# Patient Record
Sex: FEMALE | Race: WHITE | NOT HISPANIC OR LATINO | Employment: OTHER | URBAN - METROPOLITAN AREA
[De-identification: names, ages, dates, MRNs, and addresses within clinical notes are randomized per-mention and may not be internally consistent; named-entity substitution may affect disease eponyms.]

---

## 2023-05-01 ENCOUNTER — MEDICAL CORRESPONDENCE (OUTPATIENT)
Dept: HEALTH INFORMATION MANAGEMENT | Facility: CLINIC | Age: 73
End: 2023-05-01

## 2023-07-31 ENCOUNTER — ANESTHESIA EVENT (OUTPATIENT)
Dept: SURGERY | Facility: CLINIC | Age: 73
End: 2023-07-31
Payer: COMMERCIAL

## 2023-07-31 RX ORDER — IRBESARTAN 75 MG/1
75 TABLET ORAL DAILY
COMMUNITY

## 2023-07-31 RX ORDER — TRIAMCINOLONE ACETONIDE 1 MG/G
CREAM TOPICAL 2 TIMES DAILY
Status: ON HOLD | COMMUNITY
End: 2023-08-01

## 2023-07-31 RX ORDER — CHLORAL HYDRATE 500 MG
1 CAPSULE ORAL DAILY
COMMUNITY

## 2023-07-31 RX ORDER — CHLORHEXIDINE GLUCONATE ORAL RINSE 1.2 MG/ML
15 SOLUTION DENTAL
COMMUNITY

## 2023-07-31 RX ORDER — SUMATRIPTAN 50 MG/1
50 TABLET, FILM COATED ORAL
COMMUNITY

## 2023-07-31 RX ORDER — ESTRADIOL 0.1 MG/G
CREAM VAGINAL DAILY
COMMUNITY

## 2023-07-31 RX ORDER — HYDROCORTISONE 25 MG/G
CREAM TOPICAL 2 TIMES DAILY PRN
Status: ON HOLD | COMMUNITY
End: 2023-08-01

## 2023-07-31 RX ORDER — BIOTIN 10000 MCG
10 CAPSULE ORAL DAILY
Status: ON HOLD | COMMUNITY
End: 2023-08-01

## 2023-07-31 RX ORDER — FLUOXETINE 20 MG/1
20 TABLET, FILM COATED ORAL DAILY
Status: ON HOLD | COMMUNITY
End: 2023-08-01

## 2023-07-31 RX ORDER — OXYBUTYNIN CHLORIDE 15 MG/1
15 TABLET, EXTENDED RELEASE ORAL DAILY
COMMUNITY

## 2023-07-31 RX ORDER — CETIRIZINE HYDROCHLORIDE 10 MG/1
10 TABLET ORAL DAILY
COMMUNITY

## 2023-07-31 RX ORDER — LACTOBACILLUS RHAMNOSUS GG 10B CELL
1 CAPSULE ORAL 2 TIMES DAILY
Status: ON HOLD | COMMUNITY
End: 2023-08-01

## 2023-08-01 ENCOUNTER — ANESTHESIA (OUTPATIENT)
Dept: SURGERY | Facility: CLINIC | Age: 73
End: 2023-08-01
Payer: COMMERCIAL

## 2023-08-01 ENCOUNTER — HOSPITAL ENCOUNTER (OUTPATIENT)
Facility: CLINIC | Age: 73
Discharge: NURSING, REHAB OR LONG-TERM CARE | End: 2023-08-04
Attending: ORTHOPAEDIC SURGERY | Admitting: ORTHOPAEDIC SURGERY
Payer: COMMERCIAL

## 2023-08-01 DIAGNOSIS — M75.121 COMPLETE ROTATOR CUFF TEAR OR RUPTURE OF RIGHT SHOULDER, NOT SPECIFIED AS TRAUMATIC: ICD-10-CM

## 2023-08-01 DIAGNOSIS — Z98.890 POST-OPERATIVE STATE: Primary | ICD-10-CM

## 2023-08-01 DIAGNOSIS — M75.41 IMPINGEMENT SYNDROME OF RIGHT SHOULDER: ICD-10-CM

## 2023-08-01 PROCEDURE — 250N000009 HC RX 250: Performed by: ORTHOPAEDIC SURGERY

## 2023-08-01 PROCEDURE — 250N000009 HC RX 250: Performed by: ANESTHESIOLOGY

## 2023-08-01 PROCEDURE — 250N000009 HC RX 250: Performed by: NURSE ANESTHETIST, CERTIFIED REGISTERED

## 2023-08-01 PROCEDURE — 272N000001 HC OR GENERAL SUPPLY STERILE: Performed by: ORTHOPAEDIC SURGERY

## 2023-08-01 PROCEDURE — 250N000011 HC RX IP 250 OP 636: Mod: JZ | Performed by: ANESTHESIOLOGY

## 2023-08-01 PROCEDURE — 999N000141 HC STATISTIC PRE-PROCEDURE NURSING ASSESSMENT: Performed by: ORTHOPAEDIC SURGERY

## 2023-08-01 PROCEDURE — 250N000011 HC RX IP 250 OP 636: Mod: JZ | Performed by: ORTHOPAEDIC SURGERY

## 2023-08-01 PROCEDURE — 258N000003 HC RX IP 258 OP 636: Performed by: PHYSICIAN ASSISTANT

## 2023-08-01 PROCEDURE — 250N000011 HC RX IP 250 OP 636: Performed by: PHYSICIAN ASSISTANT

## 2023-08-01 PROCEDURE — 250N000025 HC SEVOFLURANE, PER MIN: Performed by: ORTHOPAEDIC SURGERY

## 2023-08-01 PROCEDURE — 250N000013 HC RX MED GY IP 250 OP 250 PS 637: Performed by: PHYSICIAN ASSISTANT

## 2023-08-01 PROCEDURE — 258N000003 HC RX IP 258 OP 636: Performed by: NURSE ANESTHETIST, CERTIFIED REGISTERED

## 2023-08-01 PROCEDURE — 93010 ELECTROCARDIOGRAM REPORT: CPT | Performed by: INTERNAL MEDICINE

## 2023-08-01 PROCEDURE — C1713 ANCHOR/SCREW BN/BN,TIS/BN: HCPCS | Performed by: ORTHOPAEDIC SURGERY

## 2023-08-01 PROCEDURE — 93005 ELECTROCARDIOGRAM TRACING: CPT

## 2023-08-01 PROCEDURE — 360N000077 HC SURGERY LEVEL 4, PER MIN: Performed by: ORTHOPAEDIC SURGERY

## 2023-08-01 PROCEDURE — 250N000012 HC RX MED GY IP 250 OP 636 PS 637: Performed by: ANESTHESIOLOGY

## 2023-08-01 PROCEDURE — 250N000011 HC RX IP 250 OP 636: Performed by: NURSE ANESTHETIST, CERTIFIED REGISTERED

## 2023-08-01 PROCEDURE — 250N000013 HC RX MED GY IP 250 OP 250 PS 637: Performed by: NURSE ANESTHETIST, CERTIFIED REGISTERED

## 2023-08-01 PROCEDURE — 370N000017 HC ANESTHESIA TECHNICAL FEE, PER MIN: Performed by: ORTHOPAEDIC SURGERY

## 2023-08-01 PROCEDURE — 710N000009 HC RECOVERY PHASE 1, LEVEL 1, PER MIN: Performed by: ORTHOPAEDIC SURGERY

## 2023-08-01 DEVICE — IMP ANCHOR ARTHREX BIO-SWIVLK W/F TAPE 4.75MM AR-2600SBS-4: Type: IMPLANTABLE DEVICE | Site: SHOULDER | Status: FUNCTIONAL

## 2023-08-01 RX ORDER — FENTANYL CITRATE 50 UG/ML
INJECTION, SOLUTION INTRAMUSCULAR; INTRAVENOUS PRN
Status: DISCONTINUED | OUTPATIENT
Start: 2023-08-01 | End: 2023-08-01

## 2023-08-01 RX ORDER — FENTANYL CITRATE 0.05 MG/ML
50 INJECTION, SOLUTION INTRAMUSCULAR; INTRAVENOUS EVERY 5 MIN PRN
Status: DISCONTINUED | OUTPATIENT
Start: 2023-08-01 | End: 2023-08-01 | Stop reason: HOSPADM

## 2023-08-01 RX ORDER — APREPITANT 40 MG/1
40 CAPSULE ORAL ONCE
Status: COMPLETED | OUTPATIENT
Start: 2023-08-01 | End: 2023-08-01

## 2023-08-01 RX ORDER — NALOXONE HYDROCHLORIDE 0.4 MG/ML
0.2 INJECTION, SOLUTION INTRAMUSCULAR; INTRAVENOUS; SUBCUTANEOUS
Status: DISCONTINUED | OUTPATIENT
Start: 2023-08-01 | End: 2023-08-04 | Stop reason: HOSPADM

## 2023-08-01 RX ORDER — HYDROMORPHONE HCL IN WATER/PF 6 MG/30 ML
0.2 PATIENT CONTROLLED ANALGESIA SYRINGE INTRAVENOUS EVERY 5 MIN PRN
Status: DISCONTINUED | OUTPATIENT
Start: 2023-08-01 | End: 2023-08-01 | Stop reason: HOSPADM

## 2023-08-01 RX ORDER — NITROFURANTOIN MACROCRYSTALS 50 MG/1
50 CAPSULE ORAL DAILY PRN
COMMUNITY

## 2023-08-01 RX ORDER — ONDANSETRON 4 MG/1
4 TABLET, ORALLY DISINTEGRATING ORAL EVERY 6 HOURS PRN
Status: DISCONTINUED | OUTPATIENT
Start: 2023-08-01 | End: 2023-08-04 | Stop reason: HOSPADM

## 2023-08-01 RX ORDER — AMOXICILLIN 250 MG
1 CAPSULE ORAL 2 TIMES DAILY
Status: DISCONTINUED | OUTPATIENT
Start: 2023-08-01 | End: 2023-08-04 | Stop reason: HOSPADM

## 2023-08-01 RX ORDER — SODIUM CHLORIDE, SODIUM LACTATE, POTASSIUM CHLORIDE, CALCIUM CHLORIDE 600; 310; 30; 20 MG/100ML; MG/100ML; MG/100ML; MG/100ML
INJECTION, SOLUTION INTRAVENOUS CONTINUOUS PRN
Status: DISCONTINUED | OUTPATIENT
Start: 2023-08-01 | End: 2023-08-01

## 2023-08-01 RX ORDER — LIDOCAINE HYDROCHLORIDE 20 MG/ML
INJECTION, SOLUTION INFILTRATION; PERINEURAL PRN
Status: DISCONTINUED | OUTPATIENT
Start: 2023-08-01 | End: 2023-08-01

## 2023-08-01 RX ORDER — FENTANYL CITRATE 0.05 MG/ML
25 INJECTION, SOLUTION INTRAMUSCULAR; INTRAVENOUS EVERY 5 MIN PRN
Status: DISCONTINUED | OUTPATIENT
Start: 2023-08-01 | End: 2023-08-01 | Stop reason: HOSPADM

## 2023-08-01 RX ORDER — HALOPERIDOL 5 MG/ML
1 INJECTION INTRAMUSCULAR
Status: COMPLETED | OUTPATIENT
Start: 2023-08-01 | End: 2023-08-01

## 2023-08-01 RX ORDER — CELECOXIB 200 MG/1
400 CAPSULE ORAL ONCE
Status: COMPLETED | OUTPATIENT
Start: 2023-08-01 | End: 2023-08-01

## 2023-08-01 RX ORDER — PROCHLORPERAZINE MALEATE 5 MG
5 TABLET ORAL EVERY 6 HOURS PRN
Status: DISCONTINUED | OUTPATIENT
Start: 2023-08-01 | End: 2023-08-04 | Stop reason: HOSPADM

## 2023-08-01 RX ORDER — HYDROXYZINE HYDROCHLORIDE 10 MG/1
10 TABLET, FILM COATED ORAL EVERY 6 HOURS PRN
Status: DISCONTINUED | OUTPATIENT
Start: 2023-08-01 | End: 2023-08-04 | Stop reason: HOSPADM

## 2023-08-01 RX ORDER — IPRATROPIUM BROMIDE AND ALBUTEROL SULFATE 2.5; .5 MG/3ML; MG/3ML
3 SOLUTION RESPIRATORY (INHALATION)
Status: DISCONTINUED | OUTPATIENT
Start: 2023-08-01 | End: 2023-08-01 | Stop reason: HOSPADM

## 2023-08-01 RX ORDER — ALBUTEROL SULFATE 90 UG/1
AEROSOL, METERED RESPIRATORY (INHALATION) PRN
Status: DISCONTINUED | OUTPATIENT
Start: 2023-08-01 | End: 2023-08-01

## 2023-08-01 RX ORDER — DEXAMETHASONE SODIUM PHOSPHATE 4 MG/ML
4 INJECTION, SOLUTION INTRA-ARTICULAR; INTRALESIONAL; INTRAMUSCULAR; INTRAVENOUS; SOFT TISSUE ONCE
Status: COMPLETED | OUTPATIENT
Start: 2023-08-01 | End: 2023-08-01

## 2023-08-01 RX ORDER — NALOXONE HYDROCHLORIDE 0.4 MG/ML
0.4 INJECTION, SOLUTION INTRAMUSCULAR; INTRAVENOUS; SUBCUTANEOUS
Status: DISCONTINUED | OUTPATIENT
Start: 2023-08-01 | End: 2023-08-04 | Stop reason: HOSPADM

## 2023-08-01 RX ORDER — ASPIRIN 81 MG/1
162 TABLET ORAL DAILY
Status: DISCONTINUED | OUTPATIENT
Start: 2023-08-01 | End: 2023-08-04 | Stop reason: HOSPADM

## 2023-08-01 RX ORDER — ACETAMINOPHEN 325 MG/1
650 TABLET ORAL
Status: DISCONTINUED | OUTPATIENT
Start: 2023-08-01 | End: 2023-08-01 | Stop reason: ALTCHOICE

## 2023-08-01 RX ORDER — ONDANSETRON 4 MG/1
4 TABLET, ORALLY DISINTEGRATING ORAL EVERY 30 MIN PRN
Status: COMPLETED | OUTPATIENT
Start: 2023-08-01 | End: 2023-08-01

## 2023-08-01 RX ORDER — MAGNESIUM HYDROXIDE 1200 MG/15ML
LIQUID ORAL PRN
Status: DISCONTINUED | OUTPATIENT
Start: 2023-08-01 | End: 2023-08-01 | Stop reason: HOSPADM

## 2023-08-01 RX ORDER — ONDANSETRON 4 MG/1
4 TABLET, ORALLY DISINTEGRATING ORAL EVERY 8 HOURS PRN
Qty: 10 TABLET | Refills: 0 | Status: SHIPPED | OUTPATIENT
Start: 2023-08-01 | End: 2024-09-16

## 2023-08-01 RX ORDER — TRAMADOL HYDROCHLORIDE 50 MG/1
50 TABLET ORAL EVERY 6 HOURS PRN
Status: DISCONTINUED | OUTPATIENT
Start: 2023-08-01 | End: 2023-08-04 | Stop reason: HOSPADM

## 2023-08-01 RX ORDER — LIDOCAINE 40 MG/G
CREAM TOPICAL
Status: DISCONTINUED | OUTPATIENT
Start: 2023-08-01 | End: 2023-08-04 | Stop reason: HOSPADM

## 2023-08-01 RX ORDER — DEXAMETHASONE SODIUM PHOSPHATE 4 MG/ML
INJECTION, SOLUTION INTRA-ARTICULAR; INTRALESIONAL; INTRAMUSCULAR; INTRAVENOUS; SOFT TISSUE PRN
Status: DISCONTINUED | OUTPATIENT
Start: 2023-08-01 | End: 2023-08-01

## 2023-08-01 RX ORDER — SODIUM CHLORIDE, SODIUM LACTATE, POTASSIUM CHLORIDE, CALCIUM CHLORIDE 600; 310; 30; 20 MG/100ML; MG/100ML; MG/100ML; MG/100ML
INJECTION, SOLUTION INTRAVENOUS CONTINUOUS
Status: DISCONTINUED | OUTPATIENT
Start: 2023-08-01 | End: 2023-08-04 | Stop reason: HOSPADM

## 2023-08-01 RX ORDER — TRAMADOL HYDROCHLORIDE 50 MG/1
50 TABLET ORAL EVERY 6 HOURS PRN
Qty: 40 TABLET | Refills: 0 | Status: SHIPPED | OUTPATIENT
Start: 2023-08-01 | End: 2023-08-02

## 2023-08-01 RX ORDER — ACETAMINOPHEN 325 MG/1
975 TABLET ORAL ONCE
Status: COMPLETED | OUTPATIENT
Start: 2023-08-01 | End: 2023-08-01

## 2023-08-01 RX ORDER — BUPIVACAINE HYDROCHLORIDE AND EPINEPHRINE 5; 5 MG/ML; UG/ML
INJECTION, SOLUTION PERINEURAL
Status: COMPLETED | OUTPATIENT
Start: 2023-08-01 | End: 2023-08-01

## 2023-08-01 RX ORDER — DIPHENHYDRAMINE HCL 12.5MG/5ML
12.5 LIQUID (ML) ORAL EVERY 6 HOURS PRN
Status: DISCONTINUED | OUTPATIENT
Start: 2023-08-01 | End: 2023-08-04 | Stop reason: HOSPADM

## 2023-08-01 RX ORDER — SODIUM CHLORIDE, SODIUM LACTATE, POTASSIUM CHLORIDE, CALCIUM CHLORIDE 600; 310; 30; 20 MG/100ML; MG/100ML; MG/100ML; MG/100ML
INJECTION, SOLUTION INTRAVENOUS CONTINUOUS
Status: DISCONTINUED | OUTPATIENT
Start: 2023-08-01 | End: 2023-08-01 | Stop reason: HOSPADM

## 2023-08-01 RX ORDER — ONDANSETRON 2 MG/ML
4 INJECTION INTRAMUSCULAR; INTRAVENOUS EVERY 6 HOURS PRN
Status: DISCONTINUED | OUTPATIENT
Start: 2023-08-01 | End: 2023-08-04 | Stop reason: HOSPADM

## 2023-08-01 RX ORDER — BISACODYL 10 MG
10 SUPPOSITORY, RECTAL RECTAL DAILY PRN
Status: DISCONTINUED | OUTPATIENT
Start: 2023-08-01 | End: 2023-08-04 | Stop reason: HOSPADM

## 2023-08-01 RX ORDER — NAPROXEN 500 MG/1
500 TABLET ORAL 2 TIMES DAILY WITH MEALS
Qty: 40 TABLET | Refills: 0 | Status: SHIPPED | OUTPATIENT
Start: 2023-08-01 | End: 2024-09-16

## 2023-08-01 RX ORDER — HYDROMORPHONE HCL IN WATER/PF 6 MG/30 ML
0.4 PATIENT CONTROLLED ANALGESIA SYRINGE INTRAVENOUS
Status: DISCONTINUED | OUTPATIENT
Start: 2023-08-01 | End: 2023-08-04 | Stop reason: HOSPADM

## 2023-08-01 RX ORDER — NAPROXEN 250 MG/1
250 TABLET ORAL EVERY 12 HOURS PRN
Status: DISCONTINUED | OUTPATIENT
Start: 2023-08-01 | End: 2023-08-04 | Stop reason: HOSPADM

## 2023-08-01 RX ORDER — BUPIVACAINE HYDROCHLORIDE AND EPINEPHRINE 2.5; 5 MG/ML; UG/ML
INJECTION, SOLUTION INFILTRATION; PERINEURAL PRN
Status: DISCONTINUED | OUTPATIENT
Start: 2023-08-01 | End: 2023-08-01 | Stop reason: HOSPADM

## 2023-08-01 RX ORDER — HYDROCODONE BITARTRATE AND ACETAMINOPHEN 5; 325 MG/1; MG/1
1-2 TABLET ORAL
Status: DISCONTINUED | OUTPATIENT
Start: 2023-08-01 | End: 2023-08-01 | Stop reason: ALTCHOICE

## 2023-08-01 RX ORDER — HYDROMORPHONE HCL IN WATER/PF 6 MG/30 ML
0.4 PATIENT CONTROLLED ANALGESIA SYRINGE INTRAVENOUS EVERY 5 MIN PRN
Status: DISCONTINUED | OUTPATIENT
Start: 2023-08-01 | End: 2023-08-01 | Stop reason: HOSPADM

## 2023-08-01 RX ORDER — PROPOFOL 10 MG/ML
INJECTION, EMULSION INTRAVENOUS PRN
Status: DISCONTINUED | OUTPATIENT
Start: 2023-08-01 | End: 2023-08-01

## 2023-08-01 RX ORDER — CEFAZOLIN SODIUM/WATER 2 G/20 ML
2 SYRINGE (ML) INTRAVENOUS
Status: COMPLETED | OUTPATIENT
Start: 2023-08-01 | End: 2023-08-01

## 2023-08-01 RX ORDER — ONDANSETRON 2 MG/ML
4 INJECTION INTRAMUSCULAR; INTRAVENOUS EVERY 30 MIN PRN
Status: COMPLETED | OUTPATIENT
Start: 2023-08-01 | End: 2023-08-01

## 2023-08-01 RX ORDER — ONDANSETRON 2 MG/ML
INJECTION INTRAMUSCULAR; INTRAVENOUS PRN
Status: DISCONTINUED | OUTPATIENT
Start: 2023-08-01 | End: 2023-08-01

## 2023-08-01 RX ORDER — ACETAMINOPHEN 325 MG/1
650 TABLET ORAL EVERY 4 HOURS PRN
Qty: 50 TABLET | Refills: 0 | Status: SHIPPED | OUTPATIENT
Start: 2023-08-01 | End: 2024-09-16

## 2023-08-01 RX ORDER — PROPOFOL 10 MG/ML
INJECTION, EMULSION INTRAVENOUS CONTINUOUS PRN
Status: DISCONTINUED | OUTPATIENT
Start: 2023-08-01 | End: 2023-08-01

## 2023-08-01 RX ORDER — POLYETHYLENE GLYCOL 3350 17 G/17G
17 POWDER, FOR SOLUTION ORAL DAILY
Status: DISCONTINUED | OUTPATIENT
Start: 2023-08-02 | End: 2023-08-04 | Stop reason: HOSPADM

## 2023-08-01 RX ORDER — HYDROMORPHONE HCL IN WATER/PF 6 MG/30 ML
0.2 PATIENT CONTROLLED ANALGESIA SYRINGE INTRAVENOUS
Status: DISCONTINUED | OUTPATIENT
Start: 2023-08-01 | End: 2023-08-04 | Stop reason: HOSPADM

## 2023-08-01 RX ORDER — HALOPERIDOL 5 MG/ML
1 INJECTION INTRAMUSCULAR ONCE
Status: DISCONTINUED | OUTPATIENT
Start: 2023-08-01 | End: 2023-08-01 | Stop reason: HOSPADM

## 2023-08-01 RX ORDER — CEFAZOLIN SODIUM 2 G/100ML
2 INJECTION, SOLUTION INTRAVENOUS EVERY 8 HOURS
Status: COMPLETED | OUTPATIENT
Start: 2023-08-01 | End: 2023-08-02

## 2023-08-01 RX ORDER — CEFAZOLIN SODIUM/WATER 2 G/20 ML
2 SYRINGE (ML) INTRAVENOUS SEE ADMIN INSTRUCTIONS
Status: DISCONTINUED | OUTPATIENT
Start: 2023-08-01 | End: 2023-08-01 | Stop reason: HOSPADM

## 2023-08-01 RX ORDER — ACETAMINOPHEN 325 MG/1
975 TABLET ORAL EVERY 8 HOURS
Status: COMPLETED | OUTPATIENT
Start: 2023-08-01 | End: 2023-08-04

## 2023-08-01 RX ADMIN — SODIUM CHLORIDE, POTASSIUM CHLORIDE, SODIUM LACTATE AND CALCIUM CHLORIDE: 600; 310; 30; 20 INJECTION, SOLUTION INTRAVENOUS at 20:52

## 2023-08-01 RX ADMIN — ONDANSETRON 4 MG: 2 INJECTION INTRAMUSCULAR; INTRAVENOUS at 16:18

## 2023-08-01 RX ADMIN — PROPOFOL 200 MG: 10 INJECTION, EMULSION INTRAVENOUS at 13:01

## 2023-08-01 RX ADMIN — FENTANYL CITRATE 50 MCG: 50 INJECTION, SOLUTION INTRAMUSCULAR; INTRAVENOUS at 13:28

## 2023-08-01 RX ADMIN — CELECOXIB 400 MG: 200 CAPSULE ORAL at 11:33

## 2023-08-01 RX ADMIN — ROCURONIUM BROMIDE 50 MG: 50 INJECTION, SOLUTION INTRAVENOUS at 13:03

## 2023-08-01 RX ADMIN — ALBUTEROL SULFATE 6 PUFF: 90 AEROSOL, METERED RESPIRATORY (INHALATION) at 14:22

## 2023-08-01 RX ADMIN — HALOPERIDOL LACTATE 1 MG: 5 INJECTION, SOLUTION INTRAMUSCULAR at 16:49

## 2023-08-01 RX ADMIN — SODIUM CHLORIDE, POTASSIUM CHLORIDE, SODIUM LACTATE AND CALCIUM CHLORIDE: 600; 310; 30; 20 INJECTION, SOLUTION INTRAVENOUS at 14:02

## 2023-08-01 RX ADMIN — BUPIVACAINE HYDROCHLORIDE AND EPINEPHRINE BITARTRATE 27 ML: 5; .005 INJECTION, SOLUTION PERINEURAL at 12:11

## 2023-08-01 RX ADMIN — IPRATROPIUM BROMIDE AND ALBUTEROL SULFATE 3 ML: .5; 3 SOLUTION RESPIRATORY (INHALATION) at 17:12

## 2023-08-01 RX ADMIN — DEXAMETHASONE SODIUM PHOSPHATE 4 MG: 4 INJECTION, SOLUTION INTRAMUSCULAR; INTRAVENOUS at 17:59

## 2023-08-01 RX ADMIN — SUCCINYLCHOLINE CHLORIDE 40 MG: 20 INJECTION, SOLUTION INTRAMUSCULAR; INTRAVENOUS; PARENTERAL at 13:01

## 2023-08-01 RX ADMIN — DEXAMETHASONE SODIUM PHOSPHATE 4 MG: 4 INJECTION, SOLUTION INTRA-ARTICULAR; INTRALESIONAL; INTRAMUSCULAR; INTRAVENOUS; SOFT TISSUE at 13:58

## 2023-08-01 RX ADMIN — Medication 2 G: at 13:18

## 2023-08-01 RX ADMIN — ACETAMINOPHEN 975 MG: 325 TABLET, FILM COATED ORAL at 11:32

## 2023-08-01 RX ADMIN — ASPIRIN 162 MG: 81 TABLET, COATED ORAL at 20:52

## 2023-08-01 RX ADMIN — APREPITANT 40 MG: 40 CAPSULE ORAL at 17:59

## 2023-08-01 RX ADMIN — FENTANYL CITRATE 50 MCG: 50 INJECTION, SOLUTION INTRAMUSCULAR; INTRAVENOUS at 13:01

## 2023-08-01 RX ADMIN — ONDANSETRON 4 MG: 2 INJECTION INTRAMUSCULAR; INTRAVENOUS at 14:16

## 2023-08-01 RX ADMIN — MIDAZOLAM 1 MG: 1 INJECTION INTRAMUSCULAR; INTRAVENOUS at 12:19

## 2023-08-01 RX ADMIN — ONDANSETRON 4 MG: 4 TABLET, ORALLY DISINTEGRATING ORAL at 20:52

## 2023-08-01 RX ADMIN — LIDOCAINE HYDROCHLORIDE 100 MG: 20 INJECTION, SOLUTION INFILTRATION; PERINEURAL at 13:01

## 2023-08-01 RX ADMIN — ACETAMINOPHEN 975 MG: 325 TABLET, FILM COATED ORAL at 20:52

## 2023-08-01 RX ADMIN — PHENYLEPHRINE HYDROCHLORIDE 0.2 MCG/KG/MIN: 10 INJECTION INTRAVENOUS at 13:28

## 2023-08-01 RX ADMIN — SODIUM CHLORIDE, POTASSIUM CHLORIDE, SODIUM LACTATE AND CALCIUM CHLORIDE: 600; 310; 30; 20 INJECTION, SOLUTION INTRAVENOUS at 12:48

## 2023-08-01 RX ADMIN — PROPOFOL 30 MCG/KG/MIN: 10 INJECTION, EMULSION INTRAVENOUS at 13:10

## 2023-08-01 RX ADMIN — PROCHLORPERAZINE EDISYLATE 5 MG: 5 INJECTION INTRAMUSCULAR; INTRAVENOUS at 22:46

## 2023-08-01 RX ADMIN — SUGAMMADEX 300 MG: 100 INJECTION, SOLUTION INTRAVENOUS at 14:28

## 2023-08-01 RX ADMIN — ONDANSETRON 4 MG: 2 INJECTION INTRAMUSCULAR; INTRAVENOUS at 15:40

## 2023-08-01 RX ADMIN — CEFAZOLIN SODIUM 2 G: 2 INJECTION, SOLUTION INTRAVENOUS at 21:44

## 2023-08-01 ASSESSMENT — ACTIVITIES OF DAILY LIVING (ADL)
ADLS_ACUITY_SCORE: 19
ADLS_ACUITY_SCORE: 33
ADLS_ACUITY_SCORE: 19

## 2023-08-01 ASSESSMENT — LIFESTYLE VARIABLES: TOBACCO_USE: 0

## 2023-08-01 ASSESSMENT — ENCOUNTER SYMPTOMS
DYSRHYTHMIAS: 0
SEIZURES: 0

## 2023-08-01 NOTE — OP NOTE
Procedure Date: 08/01/2023    SURGEON:  Ford Delgado MD.    ORTHOPEDIC PHYSICIAN ASSISTANT:  YUE Alcocer.    PREOPERATIVE DIAGNOSES:     1.  Right shoulder full-thickness supraspinatus tear.  2.  Right shoulder impingement.  3.  Right shoulder acromioclavicular arthritis.  4.  Right shoulder loose body.  5.  Chondromalacia the humeral head and significant synovitis of glenohumeral joint with labral fraying.    POSTOPERATIVE DIAGNOSES:     1.  Right shoulder full-thickness supraspinatus tear.  2.  Right shoulder impingement.  3.  Right shoulder acromioclavicular arthritis.  4.  Right shoulder loose body.  5.  Chondromalacia the humeral head and significant synovitis of glenohumeral joint with labral fraying.     PROCEDURES:     1.  Diagnostic right shoulder arthroscopy.  2.  Right shoulder arthroscopic subacromial decompression.  3.  Right shoulder arthroscopic distal clavicle excision.  4.  Right shoulder arthroscopic extensive debridement of glenoid, labrum, anterior triangle and humeral head.  5.  Right shoulder arthroscopic removal of loose body.  6.  Right shoulder arthroscopic rotator cuff repair using transosseous-equivalent technique.    INDICATIONS FOR SURGERY:  The patient is a 72-year-old female, right hand-dominant, with increasing right shoulder pain.  For more complete account of history of present illness, please see her clinic note.  She had failed reasonable nonoperative options and continued to have significant discomfort.  We discussed treatment and felt she would benefit from the above-named procedure.  Informed consent was obtained.    ANESTHETIC:  Interscalene block with general endotracheal anesthetic.    FINDINGS:     1.  Anterior triangle:  There was a large amount of synovitis.  The subscapularis had moderate fraying at the insertion.  Otherwise, intact.  2.  Glenoid:  No chondromalacia.  No bald spot.  3.  Humerus:  There was diffuse grade 2 chondromalacia over the entire weightbearing  aspect of the humeral head.  There was no exposed bone.  4.  Axillary pouch:  Mild amount of synovitis.  No loose bodies.  5.  Bicipital/labral complex:  The biceps did track well down the bicipital groove without significant tenosynovitis.  The labrum showed significant circumferential fraying.    6.  Rotator cuff:  There was a full-thickness tear of the supraspinatus.  This tissue still was of very good quality and quite mobile.  It measured approximately 2 cm anterior to posterior and 1.5 cm medial to lateral.  It still was very mobile and would completely close down to the footprint.   7.  Subacromial space:  A large amount of reactive bursal tissue was present.  There was a very large lateral ridge, which was consistent with a very large type 3 acromion.  The AC joint had significant synovitis with grade 4 chondromalacia.  There was also a loose body that measured approximately 8 x 9 x 3 mm.  That was in the subacromial space as well.    DESCRIPTION OF PROCEDURE:  The patient was correctly identified by myself in the PAR.  Her right upper extremity was marked.  An interscalene block was administered.  She was then taken to the operating room, where she was placed under general endotracheal anesthetic.  She was then placed in the beach chair position and sedated.  Ancef was administered.  She was then prepped and draped in the usual fashion.  A timeout was then performed.  The portal sites were injected with 0.25% Marcaine with epinephrine followed by induction of the trocars.  A diagnostic arthroscopy was then carried out, with the above-named findings.    The scope was then placed in the subacromial space.  A working lateral portal was created under direct visualization.  A cautery wand was utilized to perform the bursectomy, release the superior capsule off the AC joint and, also, debride the footprint of the tuberosity down to a nice bed of bare bone.  We then took the synovial resector and debrided the  rotator cuff back to good stable edges.  Through the rotator cuff tear, I created an accessory portal and was able to debride the majority of the humeral head from this portal and, also, debride the labrum until this was back to good, stable edges.  I then moved my visualization and was able to clearly see the anterior triangle and reached the anterior triangle.  I debrided the synovitis in this area as well.  The 6 mm bur was brought in, and the footprint of the tuberosity was decorticated down to a nice bed of bleeding cortical cancellous bone.    The scope was then placed laterally.  A 6 mm bur was placed posteriorly.  A cutting block technique was utilized to perform the acromioplasty, converting the type 3 acromion to a type 1 acromion.  A separate stab portal was then made directly anterior and adjacent to the AC joint.  Through this, the distal 8 mm of distal clavicle were excised again with the 6 mm bur.  Adequacy of the excision was confirmed with the anterior portal as well.  At this point, we did encounter the large loose body, and this was removed using a grasper in its entirety.    Attention was next turned back to the rotator cuff.  The scope was placed posteriorly.  Separate stab portals were then made.  We put our medial row anchors, 1 anteriorly and 1 posteriorly, both with outstanding purchase.  The sutures from each were then passed through the rotator cuff using a FiberLink.  A pulley stitch was then tied in between each anchor with the sutures and tapes from each anchor and placed 2 lateral anchors, giving us a very nice watertight closure.  The trocars were removed from the shoulder.  The wounds were closed with 3-0 Monocryl, Steri-Strips as well as sterile dressings and an UltraSling.    COMPLICATIONS:  None.    SPECIMENS:  None.    BLOOD LOSS:  Less than 5 mL.    COUNTS:  Sponge and needle counts were correct.    DISPOSITION:  The patient was taken to the [facility] in stable condition.    Ford MCRAE  MD Danny        D: 2023   T: 2023   MT: [email or web address]    Name:     ERICK ASIF  MRN:      [id number]        Account:        [id number]   :      1950           Procedure Date: 2023     Document: [id number]

## 2023-08-01 NOTE — DISCHARGE INSTRUCTIONS
Today you were given 975 mg of Tylenol at 11:30AM. The recommended daily maximum dose is 4000 mg.        Same Day Surgery Discharge Instructions for  Sedation and General Anesthesia     It's not unusual to feel dizzy, light-headed or faint for up to 24 hours after surgery or while taking pain medication.  If you have these symptoms: sit for a few minutes before standing and have someone assist you when you get up to walk or use the bathroom.    You should rest and relax for the next 24 hours. We recommend you make arrangements to have an adult stay with you for at least 24 hours after your discharge.  Avoid hazardous and strenuous activity.    DO NOT DRIVE any vehicle or operate mechanical equipment for 24 hours following the end of your surgery.  Even though you may feel normal, your reactions may be affected by the medication you have received.    Do not drink alcoholic beverages for 24 hours following surgery.     Slowly progress to your regular diet as you feel able. It's not unusual to feel nauseated and/or vomit after receiving anesthesia.  If you develop these symptoms, drink clear liquids (apple juice, ginger ale, broth, 7-up, etc. ) until you feel better.  If your nausea and vomiting persists for 24 hours, please notify your surgeon.      All narcotic pain medications, along with inactivity and anesthesia, can cause constipation. Drinking plenty of liquids and increasing fiber intake will help.    For any questions of a medical nature, call your surgeon.    Do not make important decisions for 24 hours.    If you had general anesthesia, you may have a sore throat for a couple of days related to the breathing tube used during surgery.  You may use Cepacol lozenges to help with this discomfort.  If it worsens or if you develop a fever, contact your surgeon.     If you feel your pain is not well managed with the pain medications prescribed by your surgeon, please contact your surgeon's office to let them know  "so they can address your concerns.         [facility]      DISCHARGE INSTRUCTIONS FOLLOWING   REGIONAL BLOCK ANESTHESIA    Numbness or lack of feeling in the arm/leg that was operated may last up to 24 hours.  The average time is usually 10-15 hours.  You may not be able to lift or move the arm or leg where the operation was by itself during that time.  Long-acting local anesthetic medicines were used to give you long-lasting pain relief.  Wear a sling until your arm is completely \"awake\"  Avoid bumping your arm, leg or foot while it is numb  Avoid extremes of hot or cold while it is numb  Remain quiet and restful the day of surgery.  Resume normal activities gradually over the next day or so as advise by your surgeon.  Do not drive or operate  Any machinery until your extremity is full  \"awake\"        You will have a tingling and prickly sensation in your arm/leg as the feeling begins to return; you can also expect some discomfort. The amount of discomfort is unpredictable, but if you have more pain that can be controlled with the pain medication you received, you should contact your surgeon.  Start to take your pain pills as soon as you start to feel any discomfort or pain.                  **Because you had anesthesia today and your history of sleep apnea, it is extremely important that you use your CPAP machine for the next 24 hours while napping or sleeping.**      **If you have questions or concerns about your procedure, please contact Dr Ford Delgado [phone].**      "

## 2023-08-01 NOTE — ANESTHESIA PROCEDURE NOTES
Airway       Patient location during procedure: OR       Procedure Start/Stop Times: 8/1/2023 1:03 PM  Staff -        Performed By: anesthesiologist and CRNAIndications and Patient Condition       Indications for airway management: guerrero-procedural       Induction type:RSI       Mask difficulty assessment: 0 - not attempted    Final Airway Details       Final airway type: endotracheal airway       Successful airway: ETT - single  Endotracheal Airway Details        ETT size (mm): 7.0       Cuffed: yes       Successful intubation technique: video laryngoscopy       VL Blade Size: Glidescope 3       Grade View of Cords: 1       Adjucts: stylet       Position: Left       Measured from: gums/teeth       Secured at (cm): 21       Bite block used: None    Post intubation assessment        Placement verified by: capnometry, equal breath sounds and chest rise        Number of attempts at approach: 1       Number of other approaches attempted: 0       Secured with: silk tape       Ease of procedure: easy       Dentition: Intact and Unchanged    Medication(s) Administered   Medication Administration Time: 8/1/2023 1:03 PM

## 2023-08-01 NOTE — ANESTHESIA PROCEDURE NOTES
"Brachial plexus Procedure Note    Pre-Procedure   Staff -        Anesthesiologist:  Nupur Delgado MD       Performed By: anesthesiologist       Location: pre-op       Pre-Anesthestic Checklist: patient identified, IV checked, site marked, risks and benefits discussed, informed consent, monitors and equipment checked, pre-op evaluation, at physician/surgeon's request and post-op pain management  Timeout:       Correct Patient: Yes        Correct Procedure: Yes        Correct Site: Yes        Correct Position: Yes        Correct Laterality: Yes        Site Marked: Yes  Procedure Documentation  Procedure: Brachial plexus       Diagnosis: SHOULDER ARTHRITIS       Laterality: right       Patient Position: supine       Patient Prep/Sterile Barriers: mask       Skin prep: Chloraprep       Local skin infiltrated with 3 mL of 2% lidocaine.  (superior trunk block approach).       Needle Gauge: 21.        Needle Length (Inches): 3.13        Ultrasound guided       1. Ultrasound was used to identify targeted nerve, plexus, vascular marker, or fascial plane and place a needle adjacent to it in real-time.       2. Ultrasound was used to visualize the spread of anesthetic in close proximity to the above referenced structure.       3. A permanent image is entered into the patient's record.       4. The visualized anatomic structures appeared normal.       5. There were no apparent abnormal pathologic findings.    Assessment/Narrative         The placement was negative for: blood aspirated, painful injection and site bleeding       Paresthesias: No.       Insertion/Infusion Method: Single Shot       Complications: none    Medication(s) Administered   Bupivacaine 0.5% w/ 1:200K Epi (Other) - Other   27 mL - 8/1/2023 12:11:00 PM    FOR [facility] ([facility]) ONLY:   Pain Team Contact information: please page the Pain Team Via [employer]. Search \"Pain\". During daytime hours, please page the attending first. At night please page the "  first.

## 2023-08-01 NOTE — ANESTHESIA CARE TRANSFER NOTE
Patient: Desiree Bean    Procedure: Procedure(s):  Right shoulder rotator cuff repair with subacromial decompression and right distal clavical excision       Diagnosis: Complete rotator cuff tear or rupture of right shoulder, not specified as traumatic [M75.121]  Impingement syndrome of right shoulder [M75.41]  Diagnosis Additional Information: No value filed.    Anesthesia Type:   No value filed.     Note:    Oropharynx: oropharynx clear of all foreign objects and spontaneously breathing  Level of Consciousness: awake  Oxygen Supplementation: face mask  Level of Supplemental Oxygen (L/min / FiO2): 6  Independent Airway: airway patency satisfactory and stable  Dentition: dentition unchanged  Vital Signs Stable: post-procedure vital signs reviewed and stable  Report to RN Given: handoff report given  Patient transferred to: PACU  Comments: Pt to PACU with O2 via mask, airway patent, VSS. Report to RN.  Handoff Report: Identifed the Patient, Identified the Reponsible Provider, Reviewed the pertinent medical history, Discussed the surgical course, Reviewed Intra-OP anesthesia mangement and issues during anesthesia, Set expectations for post-procedure period and Allowed opportunity for questions and acknowledgement of understanding  Vitals:  Vitals Value Taken Time   /97 08/01/23 1454   Temp     Pulse 88 08/01/23 1457   Resp 17 08/01/23 1457   SpO2 95 % 08/01/23 1457   Vitals shown include unvalidated device data.    Electronically Signed By: DANTE Pena CRNA  August 1, 2023  2:58 PM

## 2023-08-01 NOTE — ANESTHESIA PREPROCEDURE EVALUATION
Anesthesia Pre-Procedure Evaluation    Patient: Desiree Bean   MRN: [id number] : 1950        Procedure : Procedure(s):  Right shoulder rotator cuff repair          Past Medical History:   Diagnosis Date    Anxiety     Cataract     Diverticulosis of large intestine     Factor XII deficiency (H)     Gastroesophageal reflux disease with esophagitis     Hypertension     Migraine     Obese     Osteoarthritis     Sleep apnea     Varicose vein of leg       Past Surgical History:   Procedure Laterality Date    CATARACT EXTRACTION Bilateral     EYE SURGERY Right     retinal surgery    ORTHOPEDIC SURGERY Bilateral     total knee arthroplasty- right , left       Allergies   Allergen Reactions    Oxycodone GI Disturbance    Penicillins Headache    Cephalexin Rash      Social History     Tobacco Use    Smoking status: Not on file    Smokeless tobacco: Not on file   Substance Use Topics    Alcohol use: Not on file      Wt Readings from Last 1 Encounters:   No data found for Wt        Anesthesia Evaluation   Pt has had prior anesthetic.     No history of anesthetic complications       ROS/MED HX  ENT/Pulmonary:     (+) sleep apnea, uses CPAP,                                  (-) tobacco use and recent URI   Neurologic:     (+)      migraines,                       (-) no seizures, no CVA and no TIA   Cardiovascular:     (+)  hypertension- -   -  - -                                   (-) arrhythmias   METS/Exercise Tolerance: 3 - Able to walk 1-2 blocks without stopping    Hematologic: Comments: Factor XII deficiency      Musculoskeletal:   (+)  arthritis (B/l TKAs , ),             GI/Hepatic:     (+) GERD (Occasionally symptomatic),                (-) liver disease   Renal/Genitourinary:    (-) renal disease   Endo:     (+)               Obesity (BMI 48),       Psychiatric/Substance Use:     (+) psychiatric history anxiety       Infectious Disease:    (-) Recent Fever   Malignancy:       Other:             Physical Exam    Airway        Mallampati: II   TM distance: > 3 FB   Neck ROM: full   Mouth opening: > 3 cm    Respiratory Devices and Support         Dental       (+) Minor Abnormalities - some fillings, tiny chips      Cardiovascular          Rhythm and rate: regular and normal     Pulmonary           (+) decreased breath sounds           OUTSIDE LABS:  CBC: No results found for: WBC, HGB, HCT, PLT  BMP: No results found for: NA, POTASSIUM, CHLORIDE, CO2, BUN, CR, GLC  COAGS: No results found for: PTT, INR, FIBR  POC: No results found for: BGM, HCG, HCGS  HEPATIC: No results found for: ALBUMIN, PROTTOTAL, ALT, AST, GGT, ALKPHOS, BILITOTAL, BILIDIRECT, GALLO  OTHER: No results found for: PH, LACT, A1C, DICK, PHOS, MAG, LIPASE, AMYLASE, TSH, T4, T3, CRP, SED    Anesthesia Plan    ASA Status:  3    NPO Status:  NPO Appropriate    Anesthesia Type: General.     - Airway: ETT   Induction: Intravenous, Propofol.   Maintenance: Balanced.        Consents    Anesthesia Plan(s) and associated risks, benefits, and realistic alternatives discussed. Questions answered and patient/representative(s) expressed understanding.     - Discussed: Risks, Benefits and Alternatives for the PROCEDURE were discussed     - Discussed with:  Patient            Postoperative Care    Pain management: IV analgesics, Oral pain medications, Peripheral nerve block (Single Shot), Multi-modal analgesia.   PONV prophylaxis: Background Propofol Infusion, Ondansetron (or other 5HT-3), Dexamethasone or Solumedrol     Comments:                Nupur Delgado MD

## 2023-08-01 NOTE — OP NOTE
POST OPERATIVE NOTE-IMMEDIATE :    Preoperative Diagnosis:  Complete rotator cuff tear or rupture of right shoulder, not specified as traumatic [M75.121]  Impingement syndrome of right shoulder [M75.41]    Postoperative Diagnosis:   Full thickness supraspinatous tear  Right shoulder impingement  Right shoulder Acromioclavicular arthritis  Right shoulder loose body  Right glenohumeral synovitis, labral fraying and chondromalacia humeral head    Procedures:  Diagnistic right shoulder scope   Right shoulder scope decompression  Right shoulder scope distal clavicle excision  Right shoulder scope extensive debridement of the glenoid labrum, humeral head and anterior triangle  Right shoulder rotator cuff repair    Prosthetic Devices:  See scanned implant documents    Surgeon(s) and Assistants (if any):    Surgeon(s):  Dieter Garcia, Ford Pearson MD    Anesthesia:  Scalene + Gen    Drains:  None    Specimens:  None    Complications:  None.    Findings/Conclusions:  See operative note    Estimated Blood Loss:       Condition on discharge from OR:  Satisfactory    Plan:   1. Antibiotic Prophylaxis were given Ancef 2 gm. Abx given within 1 hr of surgical incision and discontinued within 24hrs.   2. DVT prophylaxis ASA will be started within 24hrs of surgery.  3. Weight Bearing Non-weight bearing  4. Discharge anticipated date 8/1/23 Home  5. Pain Medication Oxycodone    Ford Delgado MD, MD

## 2023-08-02 LAB
CREAT SERPL-MCNC: 0.56 MG/DL (ref 0.51–0.95)
FASTING STATUS PATIENT QL REPORTED: YES
GFR SERPL CREATININE-BSD FRML MDRD: >90 ML/MIN/1.73M2
GLUCOSE SERPL-MCNC: 134 MG/DL (ref 70–99)
TROPONIN T SERPL HS-MCNC: 7 NG/L

## 2023-08-02 PROCEDURE — 93010 ELECTROCARDIOGRAM REPORT: CPT | Performed by: INTERNAL MEDICINE

## 2023-08-02 PROCEDURE — 36415 COLL VENOUS BLD VENIPUNCTURE: CPT | Performed by: HOSPITALIST

## 2023-08-02 PROCEDURE — 84484 ASSAY OF TROPONIN QUANT: CPT | Performed by: HOSPITALIST

## 2023-08-02 PROCEDURE — 250N000013 HC RX MED GY IP 250 OP 250 PS 637: Performed by: HOSPITALIST

## 2023-08-02 PROCEDURE — 93005 ELECTROCARDIOGRAM TRACING: CPT

## 2023-08-02 PROCEDURE — 99204 OFFICE O/P NEW MOD 45 MIN: CPT | Performed by: HOSPITALIST

## 2023-08-02 PROCEDURE — 250N000011 HC RX IP 250 OP 636: Mod: JZ | Performed by: ORTHOPAEDIC SURGERY

## 2023-08-02 PROCEDURE — 82947 ASSAY GLUCOSE BLOOD QUANT: CPT | Performed by: HOSPITALIST

## 2023-08-02 PROCEDURE — 82565 ASSAY OF CREATININE: CPT | Performed by: ORTHOPAEDIC SURGERY

## 2023-08-02 PROCEDURE — 250N000013 HC RX MED GY IP 250 OP 250 PS 637: Performed by: PHYSICIAN ASSISTANT

## 2023-08-02 PROCEDURE — 250N000011 HC RX IP 250 OP 636: Performed by: PHYSICIAN ASSISTANT

## 2023-08-02 PROCEDURE — 36415 COLL VENOUS BLD VENIPUNCTURE: CPT | Performed by: ORTHOPAEDIC SURGERY

## 2023-08-02 RX ORDER — CETIRIZINE HYDROCHLORIDE 10 MG/1
10 TABLET ORAL DAILY
Status: DISCONTINUED | OUTPATIENT
Start: 2023-08-02 | End: 2023-08-04 | Stop reason: HOSPADM

## 2023-08-02 RX ORDER — TRAMADOL HYDROCHLORIDE 50 MG/1
50 TABLET ORAL EVERY 6 HOURS PRN
Qty: 40 TABLET | Refills: 0 | Status: SHIPPED | OUTPATIENT
Start: 2023-08-02 | End: 2024-09-16

## 2023-08-02 RX ORDER — IRBESARTAN 75 MG/1
75 TABLET ORAL DAILY
Status: DISCONTINUED | OUTPATIENT
Start: 2023-08-02 | End: 2023-08-04 | Stop reason: HOSPADM

## 2023-08-02 RX ORDER — SUMATRIPTAN 50 MG/1
50 TABLET, FILM COATED ORAL
Status: DISCONTINUED | OUTPATIENT
Start: 2023-08-02 | End: 2023-08-04 | Stop reason: HOSPADM

## 2023-08-02 RX ADMIN — IRBESARTAN 75 MG: 75 TABLET ORAL at 12:12

## 2023-08-02 RX ADMIN — ASPIRIN 162 MG: 81 TABLET, COATED ORAL at 11:01

## 2023-08-02 RX ADMIN — CEFAZOLIN SODIUM 2 G: 2 INJECTION, SOLUTION INTRAVENOUS at 05:10

## 2023-08-02 RX ADMIN — CETIRIZINE HYDROCHLORIDE 10 MG: 10 TABLET, FILM COATED ORAL at 11:01

## 2023-08-02 RX ADMIN — FLUOXETINE 20 MG: 20 CAPSULE ORAL at 11:01

## 2023-08-02 RX ADMIN — ONDANSETRON 4 MG: 2 INJECTION INTRAMUSCULAR; INTRAVENOUS at 06:02

## 2023-08-02 RX ADMIN — PROCHLORPERAZINE EDISYLATE 5 MG: 5 INJECTION INTRAMUSCULAR; INTRAVENOUS at 08:56

## 2023-08-02 RX ADMIN — OXYBUTYNIN CHLORIDE 15 MG: 10 TABLET, EXTENDED RELEASE ORAL at 12:12

## 2023-08-02 RX ADMIN — ACETAMINOPHEN 975 MG: 325 TABLET, FILM COATED ORAL at 05:11

## 2023-08-02 RX ADMIN — ACETAMINOPHEN 975 MG: 325 TABLET, FILM COATED ORAL at 21:28

## 2023-08-02 RX ADMIN — SUMATRIPTAN SUCCINATE 50 MG: 50 TABLET ORAL at 11:01

## 2023-08-02 RX ADMIN — ACETAMINOPHEN 975 MG: 325 TABLET, FILM COATED ORAL at 12:13

## 2023-08-02 RX ADMIN — TRAMADOL HYDROCHLORIDE 50 MG: 50 TABLET, COATED ORAL at 17:44

## 2023-08-02 RX ADMIN — PROCHLORPERAZINE MALEATE 5 MG: 5 TABLET ORAL at 17:44

## 2023-08-02 RX ADMIN — TRAMADOL HYDROCHLORIDE 50 MG: 50 TABLET, COATED ORAL at 00:26

## 2023-08-02 RX ADMIN — SENNOSIDES AND DOCUSATE SODIUM 1 TABLET: 50; 8.6 TABLET ORAL at 21:29

## 2023-08-02 RX ADMIN — SUMATRIPTAN SUCCINATE 50 MG: 50 TABLET ORAL at 07:05

## 2023-08-02 ASSESSMENT — ACTIVITIES OF DAILY LIVING (ADL)
ADLS_ACUITY_SCORE: 19
ADLS_ACUITY_SCORE: 21
ADLS_ACUITY_SCORE: 19
ADLS_ACUITY_SCORE: 19
ADLS_ACUITY_SCORE: 21
ADLS_ACUITY_SCORE: 19
DEPENDENT_IADLS:: INDEPENDENT

## 2023-08-02 NOTE — PLAN OF CARE
A&Ox4, VSS on RA, no pain on surgical arm, c/o pain in the back and migraine. Imitrex ordered for migraine available PRN. A1 w/ walker. Zofran and compazine given x2 for nausea.

## 2023-08-02 NOTE — PROVIDER NOTIFICATION
MD Notification    Notified Person: MD    Notification Date/Time:8/2/23, 1425    Notification Interaction:[employer] messaging    Purpose of Notification: juan Qureshi, pt is complaining of pressure chest pain which is radiating to her back, states that she had something similar in PACU but nothing in the notes regarding this. chest pressure did improve somewhat with repositioning but is still there per pt. vitals are BP:161/101, P:89, temp:98.6, RR: 14, o2 sat: 94% and pt seems otherwise asymptomatic. Do you want an EKG?     Orders Received: Stat EKG.    Comments:

## 2023-08-02 NOTE — PROGRESS NOTES
"ORTHOPEDIC UPPER EXTREMITY PROGRESS NOTE    POD# 1  Patient is a 72 year old female who underwent Procedure(s):  Right shoulder rotator cuff repair with subacromial decompression and right distal clavical excision on 2023. Patient currently having a migraine.  She gets these regularly and Imitrex is helpful.  She is asking for another dose of Imitrex.  She is also nauseous.  She lives alone and does not have 24/7 help at home, worried about caring for herself at discharge.     Vitals:   Blood pressure (!) 166/93, pulse 90, temperature 98  F (36.7  C), temperature source Oral, resp. rate 19, height 1.575 m (5' 2\"), weight 119.3 kg (263 lb), SpO2 92 %.  Temp (24hrs), Av.9  F (36.6  C), Min:97.1  F (36.2  C), Max:98.1  F (36.7  C)      EXAM   The patient is awake and lying in bed, closing eyes, appears uncomfortable .   Sensation is intact.  Digital Flexion/Extension maintained.   Brisk cap refill.   The incision is covered.    Labs: No lab results found.    ASSESSMENT  S/p R shoulder RTC repair witih subacromial decompression and R distal clavicle excision   Migraine  PLAN  1. DVT prophylaxis: aspirin  2. Weight Bearing NWB (Non weight bearing).  3. Wound Care leave undisturbed  4. Discharge anticipated date pending resolution of migraine and safe discharge dispo  home with [facility] vs TCU.   SW consulted, appreciate assistance  5. Cont Pain Control Tylenol, Tramadol, and naproxen  6. Continue with shoulder immobilizer.  Okay to remove for ADLs.  Okay for active range of motion for elbow, wrist and digits.  7. Migraine - medicine following, appreciate assistance    Renuka Randall PA-C  [facility]      "

## 2023-08-02 NOTE — CONSULTS
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 Hospitalist service was consulted for the above reasons.  On POD 1, 8/2, patient is now on room air and mildly hypertensive. She is currently up in chair resting and unfortunately having a recurrence of her migraine.  She denies any new or usual symptoms with this - has already received her imitrex and it is improving. No vision changes or new numbness/tingling/weakness. R shoulder wrapped. Denies fevers, chills, sob, or chest pain.        R shoulder rotator cuff tear, s/p repair 8/1  Post op mgmt per primary team - abx, VTE px, analgesia, activity orders and discharge plan.    Post operative acute respiratory distress with hypoxia - resolved  Baseline severe MIRELA  Briefly on oxygen after OR. Now on RA. Afebrile.   - monitor O2 sat, supplement to keep O2 sat 90% or higher  - Incentive spirometer  - OOB, activity as able as ok per primary    Hypertension  BP mildly elevated POD 1.   - PTA irbesartan to continue   - treat migraine and pain     Depression  Stable.  - PTA fluoxetine continued    IBS with diarrhea  Noted. Stable.  - continue following with PCP    History of recurrent UTI  Noted. No symptoms reported.  - monitor         Clinically Significant Risk Factors Present on Admission                  # Hypertension: Home medication list includes antihypertensive(s)   # Acute Respiratory Failure: Documented O2 saturation < 91%.  Continue supplemental oxygen as needed     #  "Severe Obesity: Estimated body mass index is 48.1 kg/m  as calculated from the following:    Height as of this encounter: 1.575 m (5' 2\").    Weight as of this encounter: 119.3 kg (263 lb).            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 Hospitalist service was consulted for the above reasons.  On POD 1, 8/2, patient is now on room air and mildly hypertensive. She is currently up in chair resting and unfortunately having a recurrence of her migraine.  She denies any new or usual symptoms with this - has already received her imitrex and it is improving. No vision changes or new numbness/tingling/weakness. R shoulder wrapped. Denies fevers, chills, sob, or chest pain.      Past Medical History    Past Medical History:   Diagnosis Date    Anxiety     Cataract     Diverticulosis of large intestine     Factor XII deficiency (H)     Gastroesophageal reflux disease with esophagitis     Hypertension     Migraine     Obese     Osteoarthritis     Sleep apnea     Varicose vein of leg        Past Surgical History   Past Surgical History:   Procedure Laterality Date    CATARACT EXTRACTION Bilateral     EYE SURGERY Right     retinal surgery    ORTHOPEDIC SURGERY Bilateral     total knee arthroplasty- right 2018, left 2021       Medications   Current Facility-Administered Medications   Medication    acetaminophen (TYLENOL) tablet 975 mg    aspirin " EC tablet 162 mg    benzocaine-menthol (CHLORASEPTIC) 6-10 MG lozenge 1 lozenge    bisacodyl (DULCOLAX) suppository 10 mg    cetirizine (zyrTEC) tablet 10 mg    diphenhydrAMINE (BENADRYL) solution 12.5 mg    FLUoxetine (PROzac) capsule 20 mg    HYDROmorphone (DILAUDID) injection 0.2 mg    Or    HYDROmorphone (DILAUDID) injection 0.4 mg    hydrOXYzine (ATARAX) tablet 10 mg    irbesartan (AVAPRO) tablet 75 mg    lactated ringers infusion    lidocaine (LMX4) cream    lidocaine 1 % 0.1-1 mL    magnesium hydroxide (MILK OF MAGNESIA) suspension 30 mL    naloxone (NARCAN) injection 0.2 mg    Or    naloxone (NARCAN) injection 0.4 mg    Or    naloxone (NARCAN) injection 0.2 mg    Or    naloxone (NARCAN) injection 0.4 mg    naproxen (NAPROSYN) tablet 250 mg    ondansetron (ZOFRAN ODT) ODT tab 4 mg    Or    ondansetron (ZOFRAN) injection 4 mg    oxybutynin ER (DITROPAN XL) 24 hr tablet 15 mg    polyethylene glycol (MIRALAX) Packet 17 g    prochlorperazine (COMPAZINE) injection 5 mg    Or    prochlorperazine (COMPAZINE) tablet 5 mg    senna-docusate (SENOKOT-S/PERICOLACE) 8.6-50 MG per tablet 1 tablet    sodium chloride (PF) 0.9% PF flush 3 mL    sodium chloride (PF) 0.9% PF flush 3 mL    SUMAtriptan (IMITREX) tablet 50 mg    traMADol (ULTRAM) tablet 50 mg          Review of Systems    The 10 point Review of Systems is negative other than noted in the HPI or here.      Physical Exam   Vital Signs: Temp: 98  F (36.7  C) Temp src: Oral BP: (!) 166/93 Pulse: 90   Resp: 19 SpO2: 92 % O2 Device: None (Room air) Oxygen Delivery: 3 LPM  Weight: 263 lbs 0 oz    Gen: NAD, pleasant, up in chair  HEENT: EOMI, MMM  Resp: no focal crackles,  no wheezes, no increased work of resp  CV: S1S2 heard, reg rhythm, reg rate  Abdo: soft, nontender, nondistended, bowel sounds present  Ext: calves nontender, well perfused, R shoulder dressed/wrapped  Neuro: aa, conversant, moving ext, CN grossly intact, no facial asymmetry      Medical Decision Making        36 MINUTES SPENT BY ME on the date of service doing chart review, history, exam, documentation & further activities per the note.      Data     I have personally reviewed the following data over the past 24 hrs:    N/A  \   N/A   / N/A     N/A N/A N/A /  134 (H)   N/A N/A 0.56 \

## 2023-08-02 NOTE — CONSULTS
Care Management Initial Consult    General Information  Assessment completed with: Patient, Patient  Type of CM/SW Visit: Initial Assessment    Primary Care Provider verified and updated as needed: Yes   Readmission within the last 30 days: no previous admission in last 30 days      Reason for Consult: discharge planning  Advance Care Planning:            Communication Assessment  Patient's communication style: spoken language (English or Bilingual)    Hearing Difficulty or Deaf: no   Wear Glasses or Blind: no    Cognitive  Cognitive/Neuro/Behavioral: WDL                      Living Environment:   People in home: alone     Current living Arrangements: condominium      Able to return to prior arrangements:  (U rec)       Family/Social Support:  Care provided by: self  Provides care for: no one  Marital Status:              Description of Support System: Supportive, Involved         Current Resources:   Patient receiving home care services: No     Community Resources: None  Equipment currently used at home: cane, straight  Supplies currently used at home: None    Employment/Financial:  Employment Status: retired        Financial Concerns: No concerns identified   Referral to Financial Worker: No       Does the patient's insurance plan have a 3 day qualifying hospital stay waiver?  No    Lifestyle & Psychosocial Needs:  Social Determinants of Health     Tobacco Use: Low Risk  (8/2/2023)    Patient History     Smoking Tobacco Use: Never     Smokeless Tobacco Use: Never     Passive Exposure: Not on file   Alcohol Use: Not on file   Financial Resource Strain: Not on file   Food Insecurity: Not on file   Transportation Needs: Not on file   Physical Activity: Not on file   Stress: Not on file   Social Connections: Not on file   Intimate Partner Violence: Not on file   Depression: Not on file   Housing Stability: Not on file       Functional Status:  Prior to admission patient needed assistance:   Dependent ADLs::  Independent, Ambulation-cane  Dependent IADLs:: Independent       Mental Health Status:  Mental Health Status: No Current Concerns       Chemical Dependency Status:  Chemical Dependency Status: No Current Concerns             Values/Beliefs:  Spiritual, Cultural Beliefs, Jew Practices, Values that affect care: yes       Cultural/Jew Practices Patient Routinely Participates In: ceremony, prayer       Additional Information:  CM Initial Consult    Eleonora is a 72 year old female here for a pre- operative evaluation for right shoulder rotator cuff tear.    Writer met with the patient in their room. Writer stated their reason for contacting the patient was for discharge planning.     Patient confirmed the PCP listed in their chart.     Patient confirmed the address listed in their chart. Patient stated that they live in a condo alone. Patient stated that they move around at home independently and sometimes with a cane. Patient stated that they only additionally use a shower chair.     Patient stated that they are not connected to a [facility] agency or any community resources.     Patient stated that they can independently dress, shower, clean, cook, and get groceries delivered to her door.     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 Patient stated that they currently make their income from a pension and social security. Patient stated they have no financial concern.     Patient stated that they are agreeable to TCU. Patient stated that they have been to a T[facility]; [facility] in [place]. Patient stated they would like to go back to [facility].    Writer will send referrals and follow up     PORSCHE Valles  [facility]  Social Work

## 2023-08-02 NOTE — PLAN OF CARE
Goal Outcome Evaluation:       A&OX4. VSS on room air ex BP is elevated. Up Ao1 with GBW. Continues to have migraine headaches Imitrex given by previous shift and one time by writer and pt was repositioned and given coffee which was effective. Migraine continued to improve as the day went on. Block still in effect in RUE. Denies pain in her shoulder. Continues to have intermittent N/V, compazine given at the start of shift which has been effective. Pt complained of chest pain at the end of shift, EKG yet to be completed talked to EKG tech and they will be up to complete soon. Discharge pending.

## 2023-08-02 NOTE — PHARMACY-ADMISSION MEDICATION HISTORY
Pharmacist Admission Medication History    Admission medication history is complete. The information provided in this note is only as accurate as the sources available at the time of the update.    Medication reconciliation/reorder completed by provider prior to medication history? No    Information Source(s): Patient and CareEverywhere/SureScripts via in-person    Pertinent Information: Patient is a reliable historian.     Changes made to PTA medication list:  Added: nitrofurantoin (filled 6/20/23 #90ds #90, patient confirms PRN use only)   Deleted: biotin 10 mg daily, hydrocortisone 2.5% rectal cream BID PRN for hemorrhoids, Culturelle BID, Citrucel 500 mg daily, triamcinolone 0.1% cream (no fill history in last 12 months) --patient confirms no longer using any of these   Changed: chlorhexidine BID --> every other day/PRN, voltaren gel to PRN, estradiol from 2x week --> every other day (patient confirms taking more often than prescribed), fluoxetine tablet --> capsule (per fill history), irbesartan from HS --> AM, specified turmeric as daily PRN     Medication Affordability:  Not including over the counter (OTC) medications, was there a time in the past 3 months when you did not take your medications as prescribed because of cost?: No    Allergies reviewed with patient and updates made in EHR: yes    Medication History Completed By: Tricia Ortiz [facility] 8/1/2023 9:41 PM    Prior to Admission medications    Medication Sig Last Dose Taking? Auth Provider   cetirizine (ZYRTEC) 10 MG tablet Take 10 mg by mouth daily Past Week at AM Yes Reported, Patient   chlorhexidine (PERIDEX) 0.12 % solution Swish and spit 15 mLs in mouth every 48 hours as needed (gum disease, dry mouth) Past Month at PRN Yes Reported, Patient   diclofenac (VOLTAREN) 1 % topical gel Apply 2-4 g topically every 6 hours as needed for moderate pain (apply to shoulder) Past Week at PRN Yes Reported, Patient   estradiol (ESTRACE) 0.1 MG/GM vaginal  cream Place vaginally every other day Past Week at Unknown time Yes Reported, Patient   fish oil-omega-3 fatty acids 1000 MG capsule Take 2 g by mouth daily Past Week at AM Yes Reported, Patient   FLUoxetine (PROZAC) 20 MG capsule Take 20 mg by mouth daily 7/29/2023 at AM Yes Unknown, Entered By History   irbesartan (AVAPRO) 75 MG tablet Take 75 mg by mouth daily Past Week at AM Yes Reported, Patient   nitroFURantoin macrocrystal (MACRODANTIN) 50 MG capsule Take 50 mg by mouth daily as needed (urinary tract infection) Past Month at PRN Yes Unknown, Entered By History   oxybutynin ER (DITROPAN XL) 15 MG 24 hr tablet Take 15 mg by mouth daily Past Week at AM Yes Reported, Patient   SUMAtriptan (IMITREX) 50 MG tablet Take 50 mg by mouth at onset of headache for migraine 8/1/2023 at Unknown time Yes Reported, Patient   Turmeric 400 MG CAPS Take 400 mg by mouth daily as needed Past Week at PRN Yes Reported, Patient

## 2023-08-02 NOTE — PLAN OF CARE
OT: attempted to see patient to initiate however patient declining citing migraine headache this AM. Pt reports she lives alone and is concerned with caring for herself at discharge. Post-op RC repair of dominant RUE and baseline L judy deficits will make performance of basic daily tasks difficult.  OT assessment to be rescheduled however anticipate need for TCU at discharge and patient would be in agreement with that plan if needed. SW aware.

## 2023-08-02 NOTE — PROVIDER NOTIFICATION
MD Notification    Notified Person: MD    Notified Person Name: Ashli    Notification Date/Time: 8/2 @0515    Notification Interaction: webpage    Purpose of Notification: Pt c/o migraine requesting imitrex that she takes at home.    Orders Received: imitrex ordered    Comments:

## 2023-08-03 ENCOUNTER — APPOINTMENT (OUTPATIENT)
Dept: OCCUPATIONAL THERAPY | Facility: CLINIC | Age: 73
End: 2023-08-03
Attending: PHYSICIAN ASSISTANT
Payer: COMMERCIAL

## 2023-08-03 LAB
ATRIAL RATE - MUSE: 83 BPM
DIASTOLIC BLOOD PRESSURE - MUSE: NORMAL MMHG
GLUCOSE BLDC GLUCOMTR-MCNC: 110 MG/DL (ref 70–99)
INTERPRETATION ECG - MUSE: NORMAL
P AXIS - MUSE: 33 DEGREES
PR INTERVAL - MUSE: 176 MS
QRS DURATION - MUSE: 86 MS
QT - MUSE: 410 MS
QTC - MUSE: 481 MS
R AXIS - MUSE: 13 DEGREES
SYSTOLIC BLOOD PRESSURE - MUSE: NORMAL MMHG
T AXIS - MUSE: 12 DEGREES
VENTRICULAR RATE- MUSE: 83 BPM

## 2023-08-03 PROCEDURE — 99233 SBSQ HOSP IP/OBS HIGH 50: CPT | Performed by: INTERNAL MEDICINE

## 2023-08-03 PROCEDURE — 250N000013 HC RX MED GY IP 250 OP 250 PS 637: Performed by: HOSPITALIST

## 2023-08-03 PROCEDURE — 97165 OT EVAL LOW COMPLEX 30 MIN: CPT | Mod: GO

## 2023-08-03 PROCEDURE — 250N000013 HC RX MED GY IP 250 OP 250 PS 637: Performed by: PHYSICIAN ASSISTANT

## 2023-08-03 PROCEDURE — 97535 SELF CARE MNGMENT TRAINING: CPT | Mod: GO

## 2023-08-03 PROCEDURE — 82962 GLUCOSE BLOOD TEST: CPT

## 2023-08-03 RX ORDER — HYDRALAZINE HYDROCHLORIDE 20 MG/ML
5-10 INJECTION INTRAMUSCULAR; INTRAVENOUS EVERY 4 HOURS PRN
Status: DISCONTINUED | OUTPATIENT
Start: 2023-08-03 | End: 2023-08-04 | Stop reason: HOSPADM

## 2023-08-03 RX ADMIN — FLUOXETINE 20 MG: 20 CAPSULE ORAL at 09:18

## 2023-08-03 RX ADMIN — OXYBUTYNIN CHLORIDE 15 MG: 10 TABLET, EXTENDED RELEASE ORAL at 09:18

## 2023-08-03 RX ADMIN — CETIRIZINE HYDROCHLORIDE 10 MG: 10 TABLET, FILM COATED ORAL at 09:18

## 2023-08-03 RX ADMIN — ACETAMINOPHEN 975 MG: 325 TABLET, FILM COATED ORAL at 21:57

## 2023-08-03 RX ADMIN — ASPIRIN 162 MG: 81 TABLET, COATED ORAL at 09:18

## 2023-08-03 RX ADMIN — TRAMADOL HYDROCHLORIDE 50 MG: 50 TABLET, COATED ORAL at 21:58

## 2023-08-03 RX ADMIN — TRAMADOL HYDROCHLORIDE 50 MG: 50 TABLET, COATED ORAL at 09:18

## 2023-08-03 RX ADMIN — SENNOSIDES AND DOCUSATE SODIUM 1 TABLET: 50; 8.6 TABLET ORAL at 21:58

## 2023-08-03 RX ADMIN — POLYETHYLENE GLYCOL 3350 17 G: 17 POWDER, FOR SOLUTION ORAL at 09:24

## 2023-08-03 RX ADMIN — ACETAMINOPHEN 975 MG: 325 TABLET, FILM COATED ORAL at 13:45

## 2023-08-03 RX ADMIN — SENNOSIDES AND DOCUSATE SODIUM 1 TABLET: 50; 8.6 TABLET ORAL at 09:18

## 2023-08-03 RX ADMIN — SUMATRIPTAN SUCCINATE 50 MG: 50 TABLET ORAL at 02:09

## 2023-08-03 RX ADMIN — ACETAMINOPHEN 975 MG: 325 TABLET, FILM COATED ORAL at 06:02

## 2023-08-03 RX ADMIN — IRBESARTAN 75 MG: 75 TABLET ORAL at 09:18

## 2023-08-03 ASSESSMENT — ACTIVITIES OF DAILY LIVING (ADL)
ADLS_ACUITY_SCORE: 25
ADLS_ACUITY_SCORE: 25
ADLS_ACUITY_SCORE: 21
PREVIOUS_RESPONSIBILITIES: MEAL PREP;HOUSEKEEPING;LAUNDRY
ADLS_ACUITY_SCORE: 21

## 2023-08-03 NOTE — UTILIZATION REVIEW
Concurrent stay review; Secondary Review Determination - [facility]        Under the authority of the Utilization Management Committee, the utilization review process indicated a secondary review on the above patient.  The review outcome is based on review of the medical records, discussions with staff, and applying clinical experience noted on the date of the review.        (x) Observation/outpatient Status Appropriate - Concurrent stay review       RATIONALE FOR DETERMINATION:     Patient delayed discharge is related to disposition, there is no medical necessity for inpatient admission at the time of this review. If there is a change in patient status, please resend for review.      Patient is a 72 year old female who underwent Procedure(s):  Right shoulder rotator cuff repair with subacromial decompression and right distal clavical excision on 8/1/2023. Pain controlled. Tolerating medication well, no nausea or vomiting.  She lives alone and does not have 24/7 help at home, planning to discharge to TCU. Medically stable for discharge, awaiting disposition.       The information on this document is developed by the utilization review team in order for the business office to ensure compliance.  This only denotes the appropriateness of proper admission status and does not reflect the quality of care rendered.       The definitions of Inpatient Status and Observation Status used in making the determination above are those provided in the CMS Coverage Manual, Chapter 1 and Chapter 6, section 70.4.       Sincerely,    Maricel Conn MD

## 2023-08-03 NOTE — PROGRESS NOTES
Care Management Follow Up    Length of Stay (days): 0    Expected Discharge Date: 08/04/2023     Concerns to be Addressed: all concerns addressed in this encounter     Patient plan of care discussed at interdisciplinary rounds: Yes    Anticipated Discharge Disposition: Skilled Nursing Facility, Transitional Care     Anticipated Discharge Services: None  Anticipated Discharge DME:      Patient/family educated on Medicare website which has current facility and service quality ratings:    Education Provided on the Discharge Plan: Yes  Patient/Family in Agreement with the Plan: yes    Referrals Placed by CM/SW:    Private pay costs discussed: transportation costs    Additional Information:  Writer spoke with Colleague Leonor who states that she was informed by [facility] that they would be able to take pt to SNF tomorrow.   Writer met with pt at bedside. Writer updated pt that [facility] has not reached out to care management about being able to accept her. Pt states understanding. Writer updated pt that [facility] would be able to accept her tomorrow. Pt states understanding. Pt states she does prefer [facility]. Pt is requesting that writer follow up with [facility] tomorrow morning and if they are not able  to take her she would be agreeable to attend [facility]. Pt states that she is hopeful to have her son in law transport her tomorrow and states she feels she would be able to get in and out of the vehicle. Pt states if her [facility] is not able to transport her then she would want [facility] transport team to take her to SNF. SW informed pt of private pay cost base and cost per mile. Pt states understanding. Pt states understanding that transport would bill her for this if she were to need this transport. Pt states no further questions or concerns at this time.  Addendum: SW discussed with pt about [facility] acceptance and private room fee of $20 per day. Pt is  agreeable to this.     Brittni Alegria, BSW  Social Work  [facility]

## 2023-08-03 NOTE — PLAN OF CARE
Goal Outcome Evaluation:       A&Ox4. VSS on room air. N/V improved today. No complaint of migraine headache.block in RUE has worn off and pt has full sensation. NWB in RUE. Pain rated 5/10, managing with tramadol and scheduled medications.up AO1 with GBW.  Voiding adequately in the BR. Discharge pending, possibly tomorrow to TCU.

## 2023-08-03 NOTE — PROGRESS NOTES
Care Management Follow Up    Length of Stay (days): 0    Expected Discharge Date: 08/03/2023     Concerns to be Addressed: all concerns addressed in this encounter     Patient plan of care discussed at interdisciplinary rounds: No    Anticipated Discharge Disposition: Skilled Nursing Facility, Transitional Care     Anticipated Discharge Services: None  Anticipated Discharge DME:      Patient/family educated on Medicare website which has current facility and service quality ratings:    Education Provided on the Discharge Plan: Yes  Patient/Family in Agreement with the Plan: yes    Referrals Placed by CM/SW:    Private pay costs discussed: Not applicable    Additional Information:  Per chart notes, patient wants [facility] or [facility]. Writer sent referrals via DOD.     Addendum 1415: Writer called Cristela in admissions at [facility] to see if they can accept patient for placement. Cristela. Cristela could not see referral and requested writer send referral again. Referral sent again to fax (number confirmed).     Writer called [facility] and left a VM for alfredito in admissions.       Addendum 8620: Call received from Arti ( [phone]) in admissions at [facility]. They have a room for patient tomorrow and can accept her any time. Writer stated she will call her in the morning with transport time     Leonor Scruggs, ARNIE, LGSW   Social Work   [facility]

## 2023-08-03 NOTE — PROGRESS NOTES
"   08/03/23 0954   Appointment Info   Signing Clinician's Name / Credentials (OT) Lindsey Enriquez OTR/L   Quick Adds   Quick Adds Certification   Living Environment   People in Home alone   Current Living Arrangements condominium   Home Accessibility   (elevator access. no stairs within condo)   Self-Care   Usual Activity Tolerance good   Current Activity Tolerance moderate   Regular Exercise No   Equipment Currently Used at Home shower chair  (has step in shower. has cane but does not use)   Fall history within last six months no   Activity/Exercise/Self-Care Comment At baseline is independent in self-cares without gait aid   Instrumental Activities of Daily Living (IADL)   Previous Responsibilities meal prep;housekeeping;laundry  (Has groceries delivered)   General Information   Onset of Illness/Injury or Date of Surgery 08/01/23   Referring Physician Dany Morales PA-C   Patient/Family Therapy Goal Statement (OT) Go to TCU   Additional Occupational Profile Info/Pertinent History of Current Problem POD2 Right shoulder rotator cuff repair with subacromial decompression and right distal clavical excision on 8/1/2023   Existing Precautions/Restrictions fall;shoulder;weight bearing  (Per orders- \"Active and Passive ROM of elbow and wrist OK.  Fist pumps every hour while patient awake\". Ultrasling for 3 weeks.)   Right Upper Extremity (Weight-bearing Status) non weight-bearing (NWB)   Cognitive Status Examination   Affect/Mental Status (Cognitive) WFL   Follows Commands follows two-step commands   Cognitive Status Comments continue to monitor   Visual Perception   Impact of Vision Impairment on Function (Vision) Pt denies vision impairment   Sensory   Sensory Comments Pt denies BUE/BLE numbness or tingling   Pain Assessment   Patient Currently in Pain Yes, see Vital Sign flowsheet   Strength Comprehensive (MMT)   Comment, General Manual Muscle Testing (MMT) Assessment RUE dominant. Not formally assessed in RUE " due to precautions. Has full RUE AROM in hand and wrist. FRANKLYN has baseline shoulder impairments   Transfers   Transfers toilet transfer;shower transfer   Shower Transfer   [place] Level (Shower Transfer) minimum assist (75% patient effort)   Toilet Transfer   [place] Level (Toilet Transfer) minimum assist (75% patient effort)   Activities of Daily Living   BADL Assessment/Intervention upper body dressing;lower body dressing;toileting;grooming   Upper Body Dressing Assessment/Training   [place] Level (Upper Body Dressing) dependent (less than 25% patient effort)   Lower Body Dressing Assessment/Training   [place] Level (Lower Body Dressing) dependent (less than 25% patient effort)   Grooming Assessment/Training   [place] Level (Grooming) moderate assist (50% patient effort)   Toileting   [place] Level (Toileting) maximum assist (25% patient effort)   Clinical Impression   Criteria for Skilled Therapeutic Interventions Met (OT) Yes, treatment indicated   OT Diagnosis Decline function   OT Problem List-Impairments impacting ADL activity tolerance impaired;balance;pain;post-surgical precautions;mobility;strength   Assessment of Occupational Performance 3-5 Performance Deficits   Identified Performance Deficits dressing, toileting, grooming, bathing, functional mobility, strenuous IADLs   Planned Therapy Interventions (OT) ADL retraining;transfer training;home program guidelines;progressive activity/exercise;risk factor education   Clinical Decision Making Complexity (OT) low complexity   Anticipated Equipment Needs Upon Discharge (OT)   (to be determined at TCU)   Risk & Benefits of therapy have been explained evaluation/treatment results reviewed;care plan/treatment goals reviewed;risks/benefits reviewed;current/potential barriers reviewed;participants voiced agreement with care plan;participants included;patient   OT Total Evaluation Time   OT Eval, Low Complexity Minutes (31637) 7    Therapy Certification   Medical Diagnosis Decline function   Start of Care Date 08/03/23   Certification date from 08/03/23   Certification date to 08/07/23   OT Goals   Therapy Frequency (OT) 5 times/wk   OT Predicted Duration/Target Date for Goal Attainment 08/07/23   OT Goals Upper Body Dressing;Lower Body Dressing;Hygiene/Grooming;Toilet Transfer/Toileting   OT: Hygiene/Grooming minimal assist   OT: Upper Body Dressing Moderate assist;within precautions   OT: Lower Body Dressing Moderate assist;within precautions   OT: Toilet Transfer/Toileting Moderate assist;toilet transfer;cleaning and garment management;using adaptive equipment;within precautions   Self-Care/Home Management   Self-Care/Home Mgmt/ADL, Compensatory, Meal Prep Minutes (78399) 24   Symptoms Noted During/After Treatment (Meal Preparation/Planning Training) fatigue;dizziness;increased pain   Treatment Detail/Skilled Intervention Upon arrival pt seated in recliner and agreeable to OT. Pt participated in education regarding proper fit of sling and sling adjusted. Sit to stand from chair with Brandon of 1 and a cue for technique. Pt had two hospital walkers in her room and participated in education that she cannot use walker. Pt ambulated 15 ft to toilet with hand hold assist with Brandon. Pt required Brandon for balance when turning to sit on toilet due to minor LOB. Stand to sit with CGA and required a cue for technique. Pt required total assist to manage depends and complete guerrero hygiene. Pt participated in education regarding future benefit of toilet hygiene aid. Sit to stand from toilet with CGA. Pt stood at sink to brush teeth and wash face. Required assist to reach forehead due to baseline L shoulder  impairments. pt ambulated 15 ft to chair with hand hold assist with Brandon. Increased time to position pt in deep chair. Pt reported need to have BM again. Assisted to toilet. Pt participated in education regarding how to progress activity tolerance  today, RUE digit flexion/extension exercise, RUE wrist flexion/extension exercise and was issued handout. Pt left on toilet with pull cord in hand. Notified RN of pt position.   OT Discharge Planning   OT Plan UB dressing, LB dressing, toileting, G/H standing. Teach RUE HEP (has handout)   OT Discharge Recommendation (DC Rec) Transitional Care Facility   OT Rationale for DC Rec Pt functioning below baseline and will benefit from continued skilled OT to maximize safety and independence. Pt requiring assist of 1 for self-cares and mobility currently. Pt lives alone and cannot have this level of assist. Pt primarily limited by impairments in surgical precautions, body habitus, pain, activity tolerance.   OT Brief overview of current status Completing toilet with maxA and cues for technique. Stood at sink for grooming. Session shortened as pt needed extended time to sit on toilet again   Total Session Time   Timed Code Treatment Minutes 24   Total Session Time (sum of timed and untimed services) 31      [facility]  OUTPATIENT OCCUPATIONAL THERAPY  EVALUATION  PLAN OF TREATMENT FOR OUTPATIENT REHABILITATION  (COMPLETE FOR INITIAL CLAIMS ONLY)  Patient's Last Name, First Name, M.I.  YOB: 1950  Desiree Bean                          Provider's Name  [facility] Medical Record No.  [id number]                             Onset Date:  08/01/23   Start of Care Date:  08/03/23   Type:     ___PT   _X_OT   ___SLP Medical Diagnosis:  Decline function                    OT Diagnosis:  Decline function Visits from SOC:  1     See note for plan of treatment, functional goals and certification details    I CERTIFY THE NEED FOR THESE SERVICES FURNISHED UNDER        THIS PLAN OF TREATMENT AND WHILE UNDER MY CARE     (Physician co-signature of this document indicates review and certification of the therapy plan).

## 2023-08-03 NOTE — PLAN OF CARE
Goal Outcome Evaluation:    A&Ox4. VSS on room air ex BP is elevated. Up Ao1 with GBW. Continues to have migraine headaches, Imitrex given and heat packs with relief. Denies pain in her shoulder. Denies N/V. Slept well through the night. Discharge pending.

## 2023-08-03 NOTE — PROGRESS NOTES
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 Hospitalist service was consulted for post-op medical management.     POD2 - s/p repair of R shoulder rotator cuff tear  Doing well post-op  Post op mgmt per primary team - abx, VTE px, analgesia, activity orders and discharge plan.     Post operative acute respiratory distress with hypoxia - resolved  Baseline severe MIRELA  Briefly on oxygen after OR. Now on RA. Afebrile.   - monitor O2 sat, supplement to keep O2 sat 90% or higher  - Incentive spirometer  - OOB, activity as able as ok per primary     Hypertension  BP continues to mildly elevated  Due to take her daily irbesartan now - will recheck after and see if BP comes down   Continue to optimize pain management today    Will add prn hydralazine today      Migraine headaches  Chronic neck pain from cervical DJD/stenosis  HA better this am after imitrex  Still having some neck discomfort this am    Chest pressure - post-op  C/o chest pressure 8/2/23  Both ECG reviewed by myself 8/1/23, 8/2/23  There is some mild T wave flattening in V1 noted in both EKG  No obvious ST wave changes  Troponin non-elevated  No symptoms this am - will continue to monitor closely    Depression  Stable.  - PTA fluoxetine continued     IBS with diarrhea  Noted. Stable.  - continue following with PCP     History of recurrent UTI  Noted. No symptoms reported.  - monitor    Medical Decision Making       50 MINUTES SPENT BY ME on the date of service doing chart review, history, exam, documentation & further activities per the note.        PPE Worn:  Mask, gloves     Diet:  "Advance Diet as Tolerated: Regular Diet Adult    DVT Prophylaxis: Defer to primary service  Ma Catheter: Not present  Lines: None     Cardiac Monitoring: None  Code Status: Full Code      Clinically Significant Risk Factors Present on Admission                  # Hypertension: Home medication list includes antihypertensive(s)      # Severe Obesity: Estimated body mass index is 48.1 kg/m  as calculated from the following:    Height as of this encounter: 1.575 m (5' 2\").    Weight as of this encounter: 119.3 kg (263 lb).            Disposition Plan           Sveta Fritz DO  Hospitalist Service  [facility]  Securely message with [employer] (more info)  Text page via [employer] Paging/Directory   ______________________________________________________________________    Interval History   Pt up in chair this am.  HA better - still having some \"neck pain\".  Just starting to feel pain now in the right shoulder.  No chest pressure or SOB this am.  Appetite good - about to eat breakfast.    Reports rotator cuff difficulty with her left shoulder, as well, and thinks that she will need TCU at time of discharge.     Physical Exam   Vital Signs: Temp: 98.6  F (37  C) Temp src: Oral BP: (!) 159/87 Pulse: 81   Resp: 18 SpO2: 95 % O2 Device: None (Room air)    Weight: 263 lbs 0 oz    GEN:  Alert, oriented x 3, appears fairly comfortable up in chair. no overt respiratory distress.  HEENT:  Normocephalic/atraumatic, no scleral icterus, no nasal discharge  CV:  Regular rate and rhythm, no loud murmur or JVD.  S1 + S2 noted, no S3 or S4.  LUNGS:  Clear to auscultation ant/lat bilaterally without rales/rhonchi/wheezing/retractions.  Symmetric chest rise on inhalation noted.  ABD:  Active bowel sounds, soft, non-tender/non-distended.  No rebound/guarding/rigidity.  EXT:  able to only lift her left arm to about 90 degrees.    Right arm in immobilizer  Dry dressing dressing noted  Fingers on the left warm, " pink  NEURO:  speech is clear and appropriate. Moving fingers on both hands without any difficulty this am.     Medications    lactated ringers 100 mL/hr at 08/01/23 2052      acetaminophen  975 mg Oral Q8H    aspirin  162 mg Oral Daily    cetirizine  10 mg Oral Daily    FLUoxetine  20 mg Oral Daily    irbesartan  75 mg Oral Daily    oxybutynin ER  15 mg Oral Daily    polyethylene glycol  17 g Oral Daily    senna-docusate  1 tablet Oral BID    sodium chloride (PF)  3 mL Intracatheter Q8H       Data     Labs and Imaging results below reviewed today.  No results for input(s): WBC, HGB, HCT, MCV, PLT in the last 168 hours.  Recent Labs   Lab 08/03/23  0606 08/02/23  0630   * 134*   CR  --  0.56   GFRESTIMATED  --  >90     Recent Labs   Lab 08/03/23  0606 08/02/23  0630   * 134*   CR  --  0.56   GFRESTIMATED  --  >90       No results found for this or any previous visit (from the past 24 hour(s)).

## 2023-08-03 NOTE — PROGRESS NOTES
"ORTHOPEDIC UPPER EXTREMITY PROGRESS NOTE    POD# 1  Patient is a 72 year old female who underwent Procedure(s):  Right shoulder rotator cuff repair with subacromial decompression and right distal clavical excision on 2023. Pain is doing better this morning, migraine improving. Pain controlled. Tolerating medication well, no nausea or vomiting.  She lives alone and does not have 24/7 help at home, planning to discharge to TCU.    Vitals:   Blood pressure (!) 151/98, pulse 87, temperature 97.7  F (36.5  C), temperature source Oral, resp. rate 18, height 1.575 m (5' 2\"), weight 119.3 kg (263 lb), SpO2 94 %.  Temp (24hrs), Av.5  F (36.9  C), Min:97.7  F (36.5  C), Max:99.2  F (37.3  C)      EXAM   The patient is awake and alert.   Sensation is intact.  Digital Flexion/Extension maintained.   Brisk cap refill.   The incision is covered.    Labs: No lab results found.    ASSESSMENT  S/p  R shoulder RTC repair witih subacromial decompression and R distal clavicle excision   Migraine - improved  PLAN  1. DVT prophylaxis: aspirin  2. Weight Bearing NWB (Non weight bearing).  3. Wound Care leave undisturbed  4. Discharge anticipated date when placement found Skilled Nursing Facility, [facility], appreciate assistance  5. Cont Pain Control Tylenol, Tramadol, and naproxen  6. Continue with shoulder immobilizer.  Okay to remove for ADLs.  Okay for active range of motion for elbow, wrist and digits.    Renuka Randall PA-C  [facility]      "

## 2023-08-04 VITALS
HEIGHT: 62 IN | TEMPERATURE: 98 F | OXYGEN SATURATION: 98 % | SYSTOLIC BLOOD PRESSURE: 130 MMHG | RESPIRATION RATE: 18 BRPM | WEIGHT: 263 LBS | HEART RATE: 80 BPM | BODY MASS INDEX: 48.4 KG/M2 | DIASTOLIC BLOOD PRESSURE: 79 MMHG

## 2023-08-04 LAB
ATRIAL RATE - MUSE: 88 BPM
DIASTOLIC BLOOD PRESSURE - MUSE: NORMAL MMHG
INTERPRETATION ECG - MUSE: NORMAL
P AXIS - MUSE: 42 DEGREES
PR INTERVAL - MUSE: 196 MS
QRS DURATION - MUSE: 80 MS
QT - MUSE: 398 MS
QTC - MUSE: 481 MS
R AXIS - MUSE: 12 DEGREES
SYSTOLIC BLOOD PRESSURE - MUSE: NORMAL MMHG
T AXIS - MUSE: -12 DEGREES
VENTRICULAR RATE- MUSE: 88 BPM

## 2023-08-04 PROCEDURE — 99207 PR NO BILLABLE SERVICE THIS VISIT: CPT | Performed by: HOSPITALIST

## 2023-08-04 PROCEDURE — 250N000013 HC RX MED GY IP 250 OP 250 PS 637: Performed by: PHYSICIAN ASSISTANT

## 2023-08-04 PROCEDURE — 250N000013 HC RX MED GY IP 250 OP 250 PS 637: Performed by: HOSPITALIST

## 2023-08-04 RX ADMIN — OXYBUTYNIN CHLORIDE 15 MG: 10 TABLET, EXTENDED RELEASE ORAL at 09:25

## 2023-08-04 RX ADMIN — ACETAMINOPHEN 975 MG: 325 TABLET, FILM COATED ORAL at 13:13

## 2023-08-04 RX ADMIN — ASPIRIN 162 MG: 81 TABLET, COATED ORAL at 09:25

## 2023-08-04 RX ADMIN — FLUOXETINE 20 MG: 20 CAPSULE ORAL at 09:25

## 2023-08-04 RX ADMIN — IRBESARTAN 75 MG: 75 TABLET ORAL at 09:25

## 2023-08-04 RX ADMIN — CETIRIZINE HYDROCHLORIDE 10 MG: 10 TABLET, FILM COATED ORAL at 09:25

## 2023-08-04 RX ADMIN — ACETAMINOPHEN 975 MG: 325 TABLET, FILM COATED ORAL at 06:24

## 2023-08-04 ASSESSMENT — ACTIVITIES OF DAILY LIVING (ADL)
ADLS_ACUITY_SCORE: 31
ADLS_ACUITY_SCORE: 31
ADLS_ACUITY_SCORE: 25
ADLS_ACUITY_SCORE: 31
ADLS_ACUITY_SCORE: 25
ADLS_ACUITY_SCORE: 31
ADLS_ACUITY_SCORE: 31

## 2023-08-04 NOTE — PLAN OF CARE
Goal Outcome Evaluation:      Plan of Care Reviewed With: patient    Overall Patient Progress: improving    POD2 Rt shoulder rotator cuff repair, pillow sling on at all time     Orientation/Cognitive: A&OX4  Mobility Level/Assist Equipment:AX1GB/W, NWB RUE  Fall Risk (Y/N): Yes  Behavior Concerns: None  Pain Management: scheduled Tylenol, repositioning  Tele/VS/O2: VSS on RA  ABNL Lab/BG: None this shift  Diet: Regular  Bowel/Bladder: Incontinent at times, large soft stool this shift  Skin Concerns: rt shoulder incision UTV  Drains/Devices: Rt arm sling  Tests/Procedures for next shift: none  Anticipated DC date & active delays: Accepted at [facility] for today, SW following

## 2023-08-04 NOTE — PROGRESS NOTES
Brief hospitalist consult follow up note      Primary team planning discharge to TCU today.    No concerns relayed to hospitalist service.     Discharge plans per primary team.

## 2023-08-04 NOTE — DISCHARGE SUMMARY
Patient vital signs are at baseline: Yes RA  Patient able to ambulate as they were prior to admission or with assist devices provided by therapies during their stay:  Yes  Patient MUST void prior to discharge:  Yes  Patient able to tolerate oral intake:  Yes  Pain has adequate pain control using Oral analgesics:  Yes  Does patient have an identified :  Yes  Has goal D/C date and time been discussed with patient:  Yes    Patient A&Ox4. CMS intact. Patient with mild pain, managed withschedule Tylenol. R shoulder incision DCI, with Orth-sling in place at all time. Up SBA. Pt discharging to TCU via family ride, AVS Package printed, included Tramadol prescription sheet, all handed to Pt/family. Belongings returned to Pt/family.

## 2023-08-04 NOTE — PROGRESS NOTES
Care Management Discharge Note    Discharge Date: 08/04/2023       Discharge Disposition: Skilled Nursing Facility, Transitional Care    Discharge Services: None    Discharge DME:      Discharge Transportation:      Private pay costs discussed: Not applicable    Does the patient's insurance plan have a 3 day qualifying hospital stay waiver?  Yes   Will the waiver be used for post-acute placement? Yes    PAS Confirmation Code: [id number]  Patient/family educated on Medicare website which has current facility and service quality ratings:      Education Provided on the Discharge Plan: Yes  Persons Notified of Discharge Plans: Pt, bedside RN, charge RN, [facility], and [facility] SNF/TCU.  Patient/Family in Agreement with the Plan: yes    Handoff Referral Completed: No    Additional Information:  Writer able to see that doctor placed discharge orders for today. Writer placed phone call to [facility] in [place] and spoke with Chantel from admissions. Chantel states they are able to accept pt today. Chantel states they will need discharge orders faxed to them via fax machine to fax number [phone]. Writer faxed discharge orders to TCU. Writer completed pass and faxed this to TCU as well.   Writer spoke with bedside RN to see if pt can transport via family transport if she were to choose this or if it would be best for pt to transport via wheelchair transport. Bedside RN states that pt is safe to transport via family car/transport.   JUAN attempted to see pt to discuss discharge and transport time. Pt is in the bathroom.  JUAN updated [facility]UC of need for packet for discharge today to TCU. JUAN placed two pass in pt's chart.       PAS-RR    D: Per DHS regulation, JUAN completed and submitted PAS-RR to [place] [employer] Direct Connect via the [employer].  PAS-RR confirmation # is :[id number]    P: Further questions may be directed to [employer] at #[phone], option #4 for PAS-RR staff.     Writer spoke  with pt at bedside. Pt would like her family to transport her at 2pm. JUAN spoke with Chantel at [facility] who states 2pm time frame works well for them. JUAN informed Chantel that JUAN has faxed over discharge orders already. Chantel states understanding. JUAN asked that Chantel call JUAN if any concerns or questions arise prior to discharge. Chantel states understanding.   Writer updated pt of her family transporting at 2pm being something chris is agreeable to.   Addendum: Writer able to see doctor signed a hard script for pt's tramadol. Writer faxed this to SNF.  Writer updated charge RN and bedside RN of transport time for 2pm.   Brittni Alegria, JENNIFFERW  Social Work  [facility]

## 2023-08-04 NOTE — PROGRESS NOTES
Patient vital signs are at baseline: Yes  Patient able to ambulate as they were prior to admission or with assist devices provided by therapies during their stay:  Yes  Patient MUST void prior to discharge:  Yes  Patient able to tolerate oral intake:  Yes  Pain has adequate pain control using Oral analgesics:  Yes  Does patient have an identified :  Yes  Has goal D/C date and time been discussed with patient:  Yes      Patient denied headaches during this shift.

## 2023-08-04 NOTE — PROGRESS NOTES
Pt is A&Ox4.pt denies migraine headaches, but feeling pain in her shoulder. Tramadol and Tylenol was given for the pain. Pt uses CPAP at night while sleeping.Pt needs met at this time.

## 2023-08-04 NOTE — PLAN OF CARE
Occupational Therapy Discharge Summary    Reason for therapy discharge:    Discharged to transitional care facility.    Progress towards therapy goal(s). See goals on Care Plan in [facility] electronic health record for goal details.  Goals partially met.  Barriers to achieving goals:   discharge from facility.    Therapy recommendation(s):    Continued therapy is recommended.  Rationale/Recommendations:  Pt functioning below baseline and will benefit from continued skilled OT to maximize safety and independence. Pt requiring assist of 1 for self-cares and mobility currently. Pt lives alone and cannot have this level of assist. Pt primarily limited by impairments in surgical precautions, body habitus, pain, activity tolerance..

## 2023-08-04 NOTE — PROGRESS NOTES
"ORTHOPEDIC UPPER EXTREMITY PROGRESS NOTE    POD# 3  Patient is a 72 year old female who underwent Procedure(s):  Right shoulder rotator cuff repair with subacromial decompression and right distal clavical excision on 2023. Pain is minimal, controlled. Tolerating medication well, no nausea or vomiting.  Planning to discharge to [facility] today.    Vitals:   Blood pressure 98/64, pulse 80, temperature 97.9  F (36.6  C), temperature source Oral, resp. rate 18, height 1.575 m (5' 2\"), weight 119.3 kg (263 lb), SpO2 95 %.  Temp (24hrs), Av.5  F (36.9  C), Min:97.7  F (36.5  C), Max:99.2  F (37.3  C)      EXAM   The patient is awake and alert.   Sensation is intact.  Digital Flexion/Extension maintained.   Brisk cap refill.   The incision is covered-dressing CDI.    Labs: No lab results found.    ASSESSMENT  S/p  R shoulder RTC repair witih subacromial decompression and R distal clavicle excision   Migraine - improved  PLAN  1. DVT prophylaxis: aspirin  2. Weight Bearing NWB (Non weight bearing).  3. Wound Care leave undisturbed  4. Discharge anticipated date when placement found Skilled Nursing Facility, [facility], appreciate assistance  - today to [facility] per notes  5. Cont Pain Control Tylenol, Tramadol, and naproxen  6. Continue with shoulder immobilizer.  Okay to remove for ADLs.  Okay for active range of motion for elbow, wrist and digits.    Kjerstin Foss PA-C  [facility]        "

## 2023-08-13 ENCOUNTER — HEALTH MAINTENANCE LETTER (OUTPATIENT)
Age: 73
End: 2023-08-13

## 2023-10-22 NOTE — ANESTHESIA POSTPROCEDURE EVALUATION
Patient: Desiree Bean    Procedure: Procedure(s):  Right shoulder rotator cuff repair with subacromial decompression and right distal clavical excision       Anesthesia Type:  General    Note:     Postop Pain Control: Uneventful            Sign Out: Well controlled pain   PONV: No   Neuro/Psych: Uneventful            Sign Out: Acceptable/Baseline neuro status   Airway/Respiratory: Uneventful            Sign Out: Acceptable/Baseline resp. status; O2 supplementation   CV/Hemodynamics: Uneventful            Sign Out: Acceptable CV status; No obvious hypovolemia; No obvious fluid overload   Other NRE: NONE   DID A NON-ROUTINE EVENT OCCUR? No         Last vitals:  Vitals Value Taken Time   /83 08/01/23 1709   Temp 36.2  C (97.1  F) 08/01/23 1454   Pulse 85 08/01/23 1709   Resp 14 08/01/23 1650   SpO2 90 % 08/01/23 1655   Vitals shown include unvalidated device data.    Electronically Signed By: Elvin Cancino MD  August 1, 2023  6:29 PM   Contraindicated

## 2024-05-16 ENCOUNTER — MEDICAL CORRESPONDENCE (OUTPATIENT)
Dept: HEALTH INFORMATION MANAGEMENT | Facility: CLINIC | Age: 74
End: 2024-05-16

## 2024-09-16 RX ORDER — SENNOSIDES 8.6 MG
1300 CAPSULE ORAL EVERY 8 HOURS PRN
Status: ON HOLD | COMMUNITY
End: 2024-09-18

## 2024-09-16 RX ORDER — LOPERAMIDE HCL 2 MG
2 CAPSULE ORAL PRN
COMMUNITY

## 2024-09-16 RX ORDER — CLINDAMYCIN HCL 300 MG
300 CAPSULE ORAL
COMMUNITY

## 2024-09-16 NOTE — PROGRESS NOTES
PTA medications updated by Medication Scribe prior to surgery via phone call with patient (last doses completed by Nurse)     Medication history sources: Patient, H&P, and Patient's home med list  In the past week, patient estimated taking medication this percent of the time: Greater than 90%      Significant changes made to the medication list:  Patient reports no longer taking the following meds (med scribe removed from PTA med list): Naprosyn, Zofran, Ultram      Additional medication history information:   Patient was advised to bring: Peridex, Estrace    Medication reconciliation completed by provider prior to medication history? No    Time spent in this activity: 35 minutes    The information provided in this note is only as accurate as the sources available at the time of update(s)      Prior to Admission medications    Medication Sig Last Dose Taking? Auth Provider Long Term End Date   acetaminophen (8 HOUR ARTHRITIS PAIN) 650 MG CR tablet Take 1,300 mg by mouth every 8 hours as needed for mild pain or fever ([id number]). 9/16/2024 at AM Yes Reported, Patient     chlorhexidine (PERIDEX) 0.12 % solution Swish and spit 15 mLs in mouth every 48 hours as needed (gum disease, dry mouth)  at PRN Yes Reported, Patient     clindamycin (CLEOCIN) 300 MG capsule Take 300 mg by mouth once as needed (Dental appointment). Past Month at PRN Yes Reported, Patient     diclofenac (VOLTAREN) 1 % topical gel Apply 2-4 g topically every 6 hours as needed for moderate pain (apply to shoulder)  at PRN Yes Reported, Patient     estradiol (ESTRACE) 0.1 MG/GM vaginal cream Place vaginally daily. 9/9/2024 at PM Yes Reported, Patient     fish oil-omega-3 fatty acids 1000 MG capsule Take 1 g by mouth daily. 9/9/2024 at AM Yes Reported, Patient     FLUoxetine (PROZAC) 20 MG capsule Take 20 mg by mouth daily 9/16/2024 at AM Yes Unknown, Entered By History No    irbesartan (AVAPRO) 75 MG tablet Take 75 mg by mouth daily 9/16/2024 at AM  Yes Reported, Patient Yes    loperamide (IMODIUM) 2 MG capsule Take 2 mg by mouth as needed for diarrhea. 9/15/2024 at PRN Yes Reported, Patient     nitroFURantoin macrocrystal (MACRODANTIN) 50 MG capsule Take 50 mg by mouth daily as needed (urinary tract infection) 9/16/2024 at PRN Yes Unknown, Entered By History     oxybutynin ER (DITROPAN XL) 15 MG 24 hr tablet Take 15 mg by mouth daily 9/16/2024 at AM Yes Reported, Patient     SUMAtriptan (IMITREX) 50 MG tablet Take 50 mg by mouth at onset of headache for migraine More than a month at PRN Yes Reported, Patient     TURMERIC PO Take 1,000 mg by mouth daily as needed. 9/9/2024 at AM Yes Reported, Patient     cetirizine (ZYRTEC) 10 MG tablet Take 10 mg by mouth daily 9/9/2024 at AM  Reported, Patient                 Medication history completed by: Padilla Miranda

## 2024-09-17 ENCOUNTER — HOSPITAL ENCOUNTER (OUTPATIENT)
Facility: CLINIC | Age: 74
Discharge: HOME | End: 2024-09-18
Attending: ORTHOPAEDIC SURGERY | Admitting: ORTHOPAEDIC SURGERY
Payer: COMMERCIAL

## 2024-09-17 ENCOUNTER — ANESTHESIA EVENT (OUTPATIENT)
Dept: SURGERY | Facility: CLINIC | Age: 74
End: 2024-09-17
Payer: COMMERCIAL

## 2024-09-17 ENCOUNTER — APPOINTMENT (OUTPATIENT)
Dept: GENERAL RADIOLOGY | Facility: CLINIC | Age: 74
End: 2024-09-17
Attending: PHYSICIAN ASSISTANT
Payer: COMMERCIAL

## 2024-09-17 ENCOUNTER — ANESTHESIA (OUTPATIENT)
Dept: SURGERY | Facility: CLINIC | Age: 74
End: 2024-09-17
Payer: COMMERCIAL

## 2024-09-17 DIAGNOSIS — Z98.890 POST-OPERATIVE STATE: Primary | ICD-10-CM

## 2024-09-17 PROBLEM — Z96.612 STATUS POST REVERSE ARTHROPLASTY OF LEFT SHOULDER: Status: ACTIVE | Noted: 2024-09-17

## 2024-09-17 PROCEDURE — 370N000017 HC ANESTHESIA TECHNICAL FEE, PER MIN: Performed by: ORTHOPAEDIC SURGERY

## 2024-09-17 PROCEDURE — C1713 ANCHOR/SCREW BN/BN,TIS/BN: HCPCS | Performed by: ORTHOPAEDIC SURGERY

## 2024-09-17 PROCEDURE — 250N000011 HC RX IP 250 OP 636: Performed by: PHYSICIAN ASSISTANT

## 2024-09-17 PROCEDURE — 250N000011 HC RX IP 250 OP 636: Performed by: ORTHOPAEDIC SURGERY

## 2024-09-17 PROCEDURE — C1776 JOINT DEVICE (IMPLANTABLE): HCPCS | Performed by: ORTHOPAEDIC SURGERY

## 2024-09-17 PROCEDURE — 272N000001 HC OR GENERAL SUPPLY STERILE: Performed by: ORTHOPAEDIC SURGERY

## 2024-09-17 PROCEDURE — 23472 RECONSTRUCT SHOULDER JOINT: CPT | Performed by: NURSE ANESTHETIST, CERTIFIED REGISTERED

## 2024-09-17 PROCEDURE — 999N000141 HC STATISTIC PRE-PROCEDURE NURSING ASSESSMENT: Performed by: ORTHOPAEDIC SURGERY

## 2024-09-17 PROCEDURE — 258N000003 HC RX IP 258 OP 636: Performed by: ANESTHESIOLOGY

## 2024-09-17 PROCEDURE — 250N000025 HC SEVOFLURANE, PER MIN: Performed by: ORTHOPAEDIC SURGERY

## 2024-09-17 PROCEDURE — 258N000001 HC RX 258: Performed by: ORTHOPAEDIC SURGERY

## 2024-09-17 PROCEDURE — 250N000013 HC RX MED GY IP 250 OP 250 PS 637: Performed by: PHYSICIAN ASSISTANT

## 2024-09-17 PROCEDURE — 250N000011 HC RX IP 250 OP 636: Performed by: ANESTHESIOLOGY

## 2024-09-17 PROCEDURE — 258N000003 HC RX IP 258 OP 636: Performed by: PHYSICIAN ASSISTANT

## 2024-09-17 PROCEDURE — 258N000003 HC RX IP 258 OP 636: Performed by: NURSE ANESTHETIST, CERTIFIED REGISTERED

## 2024-09-17 PROCEDURE — 360N000077 HC SURGERY LEVEL 4, PER MIN: Performed by: ORTHOPAEDIC SURGERY

## 2024-09-17 PROCEDURE — 250N000009 HC RX 250: Performed by: ORTHOPAEDIC SURGERY

## 2024-09-17 PROCEDURE — C9290 INJ, BUPIVACAINE LIPOSOME: HCPCS | Performed by: ANESTHESIOLOGY

## 2024-09-17 PROCEDURE — 710N000009 HC RECOVERY PHASE 1, LEVEL 1, PER MIN: Performed by: ORTHOPAEDIC SURGERY

## 2024-09-17 PROCEDURE — 250N000009 HC RX 250: Performed by: ANESTHESIOLOGY

## 2024-09-17 PROCEDURE — 250N000013 HC RX MED GY IP 250 OP 250 PS 637: Performed by: HOSPITALIST

## 2024-09-17 PROCEDURE — 999N000065 XR SHOULDER LEFT PORT G/E 2 VIEWS: Mod: LT

## 2024-09-17 PROCEDURE — 99100 ANES PT EXTEME AGE<1 YR&>70: CPT | Performed by: NURSE ANESTHETIST, CERTIFIED REGISTERED

## 2024-09-17 PROCEDURE — 250N000011 HC RX IP 250 OP 636: Performed by: NURSE ANESTHETIST, CERTIFIED REGISTERED

## 2024-09-17 PROCEDURE — 23472 RECONSTRUCT SHOULDER JOINT: CPT | Performed by: ANESTHESIOLOGY

## 2024-09-17 PROCEDURE — 99215 OFFICE O/P EST HI 40 MIN: CPT | Performed by: PHYSICIAN ASSISTANT

## 2024-09-17 DEVICE — IMP SCR LOCKING 4.5X20MM AQLS DWD020: Type: IMPLANTABLE DEVICE | Site: SHOULDER | Status: FUNCTIONAL

## 2024-09-17 DEVICE — IMP SCR LOCKING 4.5X29MM AQLS DWD029: Type: IMPLANTABLE DEVICE | Site: SHOULDER | Status: FUNCTIONAL

## 2024-09-17 DEVICE — IMPLANTABLE DEVICE
Type: IMPLANTABLE DEVICE | Site: SHOULDER | Status: FUNCTIONAL
Brand: AEQUALIS REVERSED II

## 2024-09-17 DEVICE — IMPLANTABLE DEVICE
Type: IMPLANTABLE DEVICE | Site: SHOULDER | Status: FUNCTIONAL
Brand: TORNIER FLEX SHOULDER SYSTEM

## 2024-09-17 DEVICE — IMP SCR FIXATION 4.5X20MM AQLS VDV220: Type: IMPLANTABLE DEVICE | Site: SHOULDER | Status: FUNCTIONAL

## 2024-09-17 RX ORDER — IRBESARTAN 75 MG/1
75 TABLET ORAL DAILY
Status: DISCONTINUED | OUTPATIENT
Start: 2024-09-18 | End: 2024-09-18 | Stop reason: HOSPADM

## 2024-09-17 RX ORDER — HYDRALAZINE HYDROCHLORIDE 20 MG/ML
10 INJECTION INTRAMUSCULAR; INTRAVENOUS EVERY 6 HOURS PRN
Status: DISCONTINUED | OUTPATIENT
Start: 2024-09-17 | End: 2024-09-18 | Stop reason: HOSPADM

## 2024-09-17 RX ORDER — SODIUM CHLORIDE, SODIUM LACTATE, POTASSIUM CHLORIDE, CALCIUM CHLORIDE 600; 310; 30; 20 MG/100ML; MG/100ML; MG/100ML; MG/100ML
INJECTION, SOLUTION INTRAVENOUS CONTINUOUS
Status: DISCONTINUED | OUTPATIENT
Start: 2024-09-17 | End: 2024-09-17 | Stop reason: HOSPADM

## 2024-09-17 RX ORDER — DEXAMETHASONE SODIUM PHOSPHATE 4 MG/ML
4 INJECTION, SOLUTION INTRA-ARTICULAR; INTRALESIONAL; INTRAMUSCULAR; INTRAVENOUS; SOFT TISSUE
Status: DISCONTINUED | OUTPATIENT
Start: 2024-09-17 | End: 2024-09-17 | Stop reason: HOSPADM

## 2024-09-17 RX ORDER — ONDANSETRON 2 MG/ML
4 INJECTION INTRAMUSCULAR; INTRAVENOUS EVERY 30 MIN PRN
Status: DISCONTINUED | OUTPATIENT
Start: 2024-09-17 | End: 2024-09-17 | Stop reason: HOSPADM

## 2024-09-17 RX ORDER — SUMATRIPTAN 50 MG/1
50 TABLET, FILM COATED ORAL ONCE
Status: COMPLETED | OUTPATIENT
Start: 2024-09-17 | End: 2024-09-17

## 2024-09-17 RX ORDER — CELECOXIB 100 MG/1
100 CAPSULE ORAL 2 TIMES DAILY
Status: DISCONTINUED | OUTPATIENT
Start: 2024-09-17 | End: 2024-09-18 | Stop reason: HOSPADM

## 2024-09-17 RX ORDER — NALOXONE HYDROCHLORIDE 0.4 MG/ML
0.4 INJECTION, SOLUTION INTRAMUSCULAR; INTRAVENOUS; SUBCUTANEOUS
Status: DISCONTINUED | OUTPATIENT
Start: 2024-09-17 | End: 2024-09-18 | Stop reason: HOSPADM

## 2024-09-17 RX ORDER — LIDOCAINE HYDROCHLORIDE 20 MG/ML
INJECTION, SOLUTION INFILTRATION; PERINEURAL PRN
Status: DISCONTINUED | OUTPATIENT
Start: 2024-09-17 | End: 2024-09-17

## 2024-09-17 RX ORDER — ONDANSETRON 2 MG/ML
4 INJECTION INTRAMUSCULAR; INTRAVENOUS EVERY 6 HOURS PRN
Status: DISCONTINUED | OUTPATIENT
Start: 2024-09-17 | End: 2024-09-18 | Stop reason: HOSPADM

## 2024-09-17 RX ORDER — FENTANYL CITRATE 0.05 MG/ML
25 INJECTION, SOLUTION INTRAMUSCULAR; INTRAVENOUS EVERY 5 MIN PRN
Status: DISCONTINUED | OUTPATIENT
Start: 2024-09-17 | End: 2024-09-17 | Stop reason: HOSPADM

## 2024-09-17 RX ORDER — HYDROMORPHONE HCL IN WATER/PF 6 MG/30 ML
0.4 PATIENT CONTROLLED ANALGESIA SYRINGE INTRAVENOUS EVERY 5 MIN PRN
Status: DISCONTINUED | OUTPATIENT
Start: 2024-09-17 | End: 2024-09-17 | Stop reason: HOSPADM

## 2024-09-17 RX ORDER — DEXAMETHASONE SODIUM PHOSPHATE 4 MG/ML
INJECTION, SOLUTION INTRA-ARTICULAR; INTRALESIONAL; INTRAMUSCULAR; INTRAVENOUS; SOFT TISSUE PRN
Status: DISCONTINUED | OUTPATIENT
Start: 2024-09-17 | End: 2024-09-17

## 2024-09-17 RX ORDER — ACETAMINOPHEN 325 MG/1
975 TABLET ORAL ONCE
Status: COMPLETED | OUTPATIENT
Start: 2024-09-17 | End: 2024-09-17

## 2024-09-17 RX ORDER — LOPERAMIDE HCL 2 MG
2 CAPSULE ORAL 3 TIMES DAILY PRN
Status: DISCONTINUED | OUTPATIENT
Start: 2024-09-17 | End: 2024-09-18 | Stop reason: HOSPADM

## 2024-09-17 RX ORDER — CELECOXIB 200 MG/1
400 CAPSULE ORAL ONCE
Status: COMPLETED | OUTPATIENT
Start: 2024-09-17 | End: 2024-09-17

## 2024-09-17 RX ORDER — ONDANSETRON 4 MG/1
4 TABLET, ORALLY DISINTEGRATING ORAL EVERY 30 MIN PRN
Status: DISCONTINUED | OUTPATIENT
Start: 2024-09-17 | End: 2024-09-17 | Stop reason: HOSPADM

## 2024-09-17 RX ORDER — DIPHENHYDRAMINE HCL 12.5MG/5ML
12.5 LIQUID (ML) ORAL EVERY 6 HOURS PRN
Status: DISCONTINUED | OUTPATIENT
Start: 2024-09-17 | End: 2024-09-18 | Stop reason: HOSPADM

## 2024-09-17 RX ORDER — SODIUM CHLORIDE, SODIUM LACTATE, POTASSIUM CHLORIDE, CALCIUM CHLORIDE 600; 310; 30; 20 MG/100ML; MG/100ML; MG/100ML; MG/100ML
INJECTION, SOLUTION INTRAVENOUS CONTINUOUS PRN
Status: DISCONTINUED | OUTPATIENT
Start: 2024-09-17 | End: 2024-09-17

## 2024-09-17 RX ORDER — MAGNESIUM HYDROXIDE 1200 MG/15ML
LIQUID ORAL PRN
Status: DISCONTINUED | OUTPATIENT
Start: 2024-09-17 | End: 2024-09-17 | Stop reason: HOSPADM

## 2024-09-17 RX ORDER — LIDOCAINE 40 MG/G
CREAM TOPICAL
Status: DISCONTINUED | OUTPATIENT
Start: 2024-09-17 | End: 2024-09-18 | Stop reason: HOSPADM

## 2024-09-17 RX ORDER — TRAMADOL HYDROCHLORIDE 50 MG/1
50-100 TABLET ORAL EVERY 6 HOURS PRN
Status: DISCONTINUED | OUTPATIENT
Start: 2024-09-17 | End: 2024-09-18 | Stop reason: HOSPADM

## 2024-09-17 RX ORDER — VANCOMYCIN HYDROCHLORIDE 1 G/20ML
INJECTION, POWDER, LYOPHILIZED, FOR SOLUTION INTRAVENOUS PRN
Status: DISCONTINUED | OUTPATIENT
Start: 2024-09-17 | End: 2024-09-17 | Stop reason: HOSPADM

## 2024-09-17 RX ORDER — HYDROMORPHONE HCL IN WATER/PF 6 MG/30 ML
0.2 PATIENT CONTROLLED ANALGESIA SYRINGE INTRAVENOUS EVERY 5 MIN PRN
Status: DISCONTINUED | OUTPATIENT
Start: 2024-09-17 | End: 2024-09-17 | Stop reason: HOSPADM

## 2024-09-17 RX ORDER — CEFAZOLIN SODIUM/WATER 2 G/20 ML
2 SYRINGE (ML) INTRAVENOUS
Status: COMPLETED | OUTPATIENT
Start: 2024-09-17 | End: 2024-09-17

## 2024-09-17 RX ORDER — ASPIRIN 81 MG/1
162 TABLET ORAL DAILY
Status: DISCONTINUED | OUTPATIENT
Start: 2024-09-17 | End: 2024-09-18 | Stop reason: HOSPADM

## 2024-09-17 RX ORDER — HYDROMORPHONE HCL IN WATER/PF 6 MG/30 ML
0.2 PATIENT CONTROLLED ANALGESIA SYRINGE INTRAVENOUS
Status: DISCONTINUED | OUTPATIENT
Start: 2024-09-17 | End: 2024-09-18 | Stop reason: HOSPADM

## 2024-09-17 RX ORDER — PROPOFOL 10 MG/ML
INJECTION, EMULSION INTRAVENOUS PRN
Status: DISCONTINUED | OUTPATIENT
Start: 2024-09-17 | End: 2024-09-17

## 2024-09-17 RX ORDER — HYDROMORPHONE HCL IN WATER/PF 6 MG/30 ML
0.4 PATIENT CONTROLLED ANALGESIA SYRINGE INTRAVENOUS
Status: DISCONTINUED | OUTPATIENT
Start: 2024-09-17 | End: 2024-09-18 | Stop reason: HOSPADM

## 2024-09-17 RX ORDER — FENTANYL CITRATE 0.05 MG/ML
50 INJECTION, SOLUTION INTRAMUSCULAR; INTRAVENOUS EVERY 5 MIN PRN
Status: DISCONTINUED | OUTPATIENT
Start: 2024-09-17 | End: 2024-09-17 | Stop reason: HOSPADM

## 2024-09-17 RX ORDER — LIDOCAINE 40 MG/G
CREAM TOPICAL
Status: DISCONTINUED | OUTPATIENT
Start: 2024-09-17 | End: 2024-09-17 | Stop reason: HOSPADM

## 2024-09-17 RX ORDER — ONDANSETRON 2 MG/ML
INJECTION INTRAMUSCULAR; INTRAVENOUS PRN
Status: DISCONTINUED | OUTPATIENT
Start: 2024-09-17 | End: 2024-09-17

## 2024-09-17 RX ORDER — HYDROXYZINE HYDROCHLORIDE 10 MG/1
10 TABLET, FILM COATED ORAL EVERY 6 HOURS PRN
Status: DISCONTINUED | OUTPATIENT
Start: 2024-09-17 | End: 2024-09-18 | Stop reason: HOSPADM

## 2024-09-17 RX ORDER — TRANEXAMIC ACID 650 MG/1
1950 TABLET ORAL ONCE
Status: COMPLETED | OUTPATIENT
Start: 2024-09-17 | End: 2024-09-17

## 2024-09-17 RX ORDER — FENTANYL CITRATE 50 UG/ML
INJECTION, SOLUTION INTRAMUSCULAR; INTRAVENOUS PRN
Status: DISCONTINUED | OUTPATIENT
Start: 2024-09-17 | End: 2024-09-17

## 2024-09-17 RX ORDER — SODIUM CHLORIDE, SODIUM LACTATE, POTASSIUM CHLORIDE, CALCIUM CHLORIDE 600; 310; 30; 20 MG/100ML; MG/100ML; MG/100ML; MG/100ML
INJECTION, SOLUTION INTRAVENOUS CONTINUOUS
Status: DISCONTINUED | OUTPATIENT
Start: 2024-09-17 | End: 2024-09-18 | Stop reason: HOSPADM

## 2024-09-17 RX ORDER — PROCHLORPERAZINE MALEATE 5 MG
5 TABLET ORAL EVERY 6 HOURS PRN
Status: DISCONTINUED | OUTPATIENT
Start: 2024-09-17 | End: 2024-09-18 | Stop reason: HOSPADM

## 2024-09-17 RX ORDER — CEFAZOLIN SODIUM 2 G/100ML
2 INJECTION, SOLUTION INTRAVENOUS EVERY 8 HOURS
Status: COMPLETED | OUTPATIENT
Start: 2024-09-17 | End: 2024-09-18

## 2024-09-17 RX ORDER — ACETAMINOPHEN 325 MG/1
975 TABLET ORAL EVERY 8 HOURS
Status: DISCONTINUED | OUTPATIENT
Start: 2024-09-17 | End: 2024-09-18 | Stop reason: HOSPADM

## 2024-09-17 RX ORDER — NALOXONE HYDROCHLORIDE 0.4 MG/ML
0.2 INJECTION, SOLUTION INTRAMUSCULAR; INTRAVENOUS; SUBCUTANEOUS
Status: DISCONTINUED | OUTPATIENT
Start: 2024-09-17 | End: 2024-09-18 | Stop reason: HOSPADM

## 2024-09-17 RX ORDER — BISACODYL 10 MG
10 SUPPOSITORY, RECTAL RECTAL DAILY PRN
Status: DISCONTINUED | OUTPATIENT
Start: 2024-09-17 | End: 2024-09-18 | Stop reason: HOSPADM

## 2024-09-17 RX ORDER — ONDANSETRON 4 MG/1
4 TABLET, ORALLY DISINTEGRATING ORAL EVERY 6 HOURS PRN
Status: DISCONTINUED | OUTPATIENT
Start: 2024-09-17 | End: 2024-09-18 | Stop reason: HOSPADM

## 2024-09-17 RX ORDER — POLYETHYLENE GLYCOL 3350 17 G/17G
17 POWDER, FOR SOLUTION ORAL DAILY
Status: DISCONTINUED | OUTPATIENT
Start: 2024-09-18 | End: 2024-09-18 | Stop reason: HOSPADM

## 2024-09-17 RX ORDER — NALOXONE HYDROCHLORIDE 0.4 MG/ML
0.1 INJECTION, SOLUTION INTRAMUSCULAR; INTRAVENOUS; SUBCUTANEOUS
Status: DISCONTINUED | OUTPATIENT
Start: 2024-09-17 | End: 2024-09-17 | Stop reason: HOSPADM

## 2024-09-17 RX ORDER — CEFAZOLIN SODIUM/WATER 2 G/20 ML
2 SYRINGE (ML) INTRAVENOUS SEE ADMIN INSTRUCTIONS
Status: DISCONTINUED | OUTPATIENT
Start: 2024-09-17 | End: 2024-09-17 | Stop reason: HOSPADM

## 2024-09-17 RX ORDER — AMOXICILLIN 250 MG
1 CAPSULE ORAL 2 TIMES DAILY
Status: DISCONTINUED | OUTPATIENT
Start: 2024-09-17 | End: 2024-09-18 | Stop reason: HOSPADM

## 2024-09-17 RX ADMIN — PHENYLEPHRINE HYDROCHLORIDE 0.2 MCG/KG/MIN: 10 INJECTION INTRAVENOUS at 08:46

## 2024-09-17 RX ADMIN — ASPIRIN 162 MG: 81 TABLET, COATED ORAL at 14:29

## 2024-09-17 RX ADMIN — CELECOXIB 400 MG: 200 CAPSULE ORAL at 07:48

## 2024-09-17 RX ADMIN — CELECOXIB 100 MG: 100 CAPSULE ORAL at 20:30

## 2024-09-17 RX ADMIN — LIDOCAINE HYDROCHLORIDE 100 MG: 20 INJECTION, SOLUTION INFILTRATION; PERINEURAL at 08:33

## 2024-09-17 RX ADMIN — ACETAMINOPHEN 975 MG: 325 TABLET ORAL at 23:30

## 2024-09-17 RX ADMIN — ACETAMINOPHEN 975 MG: 325 TABLET ORAL at 15:44

## 2024-09-17 RX ADMIN — SUMATRIPTAN SUCCINATE 50 MG: 50 TABLET ORAL at 20:58

## 2024-09-17 RX ADMIN — BUPIVACAINE HYDROCHLORIDE 10 ML: 5 INJECTION, SOLUTION EPIDURAL; INTRACAUDAL at 08:15

## 2024-09-17 RX ADMIN — ONDANSETRON 4 MG: 2 INJECTION INTRAMUSCULAR; INTRAVENOUS at 08:45

## 2024-09-17 RX ADMIN — PHENYLEPHRINE HYDROCHLORIDE 100 MCG: 10 INJECTION INTRAVENOUS at 09:36

## 2024-09-17 RX ADMIN — TRANEXAMIC ACID 1950 MG: 650 TABLET ORAL at 07:48

## 2024-09-17 RX ADMIN — Medication 3 G: at 08:30

## 2024-09-17 RX ADMIN — SODIUM CHLORIDE, POTASSIUM CHLORIDE, SODIUM LACTATE AND CALCIUM CHLORIDE: 600; 310; 30; 20 INJECTION, SOLUTION INTRAVENOUS at 07:49

## 2024-09-17 RX ADMIN — ONDANSETRON 4 MG: 2 INJECTION INTRAMUSCULAR; INTRAVENOUS at 15:44

## 2024-09-17 RX ADMIN — ACETAMINOPHEN 975 MG: 325 TABLET ORAL at 07:48

## 2024-09-17 RX ADMIN — DEXAMETHASONE SODIUM PHOSPHATE 4 MG: 4 INJECTION, SOLUTION INTRA-ARTICULAR; INTRALESIONAL; INTRAMUSCULAR; INTRAVENOUS; SOFT TISSUE at 08:45

## 2024-09-17 RX ADMIN — FENTANYL CITRATE 50 MCG: 50 INJECTION, SOLUTION INTRAMUSCULAR; INTRAVENOUS at 10:52

## 2024-09-17 RX ADMIN — SODIUM CHLORIDE, POTASSIUM CHLORIDE, SODIUM LACTATE AND CALCIUM CHLORIDE: 600; 310; 30; 20 INJECTION, SOLUTION INTRAVENOUS at 08:29

## 2024-09-17 RX ADMIN — CEFAZOLIN SODIUM 2 G: 2 INJECTION, SOLUTION INTRAVENOUS at 23:30

## 2024-09-17 RX ADMIN — PHENYLEPHRINE HYDROCHLORIDE 100 MCG: 10 INJECTION INTRAVENOUS at 09:12

## 2024-09-17 RX ADMIN — MIDAZOLAM 1 MG: 1 INJECTION INTRAMUSCULAR; INTRAVENOUS at 08:17

## 2024-09-17 RX ADMIN — BUPIVACAINE 10 ML: 13.3 INJECTION, SUSPENSION, LIPOSOMAL INFILTRATION at 08:15

## 2024-09-17 RX ADMIN — FENTANYL CITRATE 50 MCG: 50 INJECTION INTRAMUSCULAR; INTRAVENOUS at 08:33

## 2024-09-17 RX ADMIN — PROPOFOL 200 MG: 10 INJECTION, EMULSION INTRAVENOUS at 08:33

## 2024-09-17 RX ADMIN — FENTANYL CITRATE 50 MCG: 50 INJECTION INTRAMUSCULAR; INTRAVENOUS at 09:04

## 2024-09-17 RX ADMIN — SODIUM CHLORIDE, POTASSIUM CHLORIDE, SODIUM LACTATE AND CALCIUM CHLORIDE: 600; 310; 30; 20 INJECTION, SOLUTION INTRAVENOUS at 14:29

## 2024-09-17 RX ADMIN — PHENYLEPHRINE HYDROCHLORIDE 100 MCG: 10 INJECTION INTRAVENOUS at 08:38

## 2024-09-17 RX ADMIN — CEFAZOLIN SODIUM 2 G: 2 INJECTION, SOLUTION INTRAVENOUS at 15:44

## 2024-09-17 RX ADMIN — SENNOSIDES AND DOCUSATE SODIUM 1 TABLET: 50; 8.6 TABLET ORAL at 20:30

## 2024-09-17 RX ADMIN — FENTANYL CITRATE 50 MCG: 50 INJECTION, SOLUTION INTRAMUSCULAR; INTRAVENOUS at 11:10

## 2024-09-17 ASSESSMENT — ACTIVITIES OF DAILY LIVING (ADL)
ADLS_ACUITY_SCORE: 27
ADLS_ACUITY_SCORE: 27
ADLS_ACUITY_SCORE: 22
ADLS_ACUITY_SCORE: 22
ADLS_ACUITY_SCORE: 24
ADLS_ACUITY_SCORE: 25
ADLS_ACUITY_SCORE: 24
ADLS_ACUITY_SCORE: 25
ADLS_ACUITY_SCORE: 26
ADLS_ACUITY_SCORE: 25
ADLS_ACUITY_SCORE: 22
ADLS_ACUITY_SCORE: 25
ADLS_ACUITY_SCORE: 27
ADLS_ACUITY_SCORE: 27
ADLS_ACUITY_SCORE: 26
ADLS_ACUITY_SCORE: 22
ADLS_ACUITY_SCORE: 36

## 2024-09-17 ASSESSMENT — LIFESTYLE VARIABLES: TOBACCO_USE: 0

## 2024-09-17 ASSESSMENT — ENCOUNTER SYMPTOMS
DYSRHYTHMIAS: 0
SEIZURES: 0

## 2024-09-17 NOTE — ANESTHESIA POSTPROCEDURE EVALUATION
Patient: Desiree Bean    Procedure: Procedure(s):  LEFT REVERSE TOTAL SHOULDER ARTHROPLASTY       Anesthesia Type:  General    Note:  Disposition: Inpatient   Postop Pain Control: Uneventful            Sign Out: Well controlled pain   PONV: No   Neuro/Psych: Uneventful            Sign Out: Acceptable/Baseline neuro status   Airway/Respiratory: Uneventful            Sign Out: Acceptable/Baseline resp. status   CV/Hemodynamics: Uneventful            Sign Out: Acceptable CV status   Other NRE: NONE   DID A NON-ROUTINE EVENT OCCUR? No           Last vitals:  Vitals Value Taken Time   /69 09/17/24 1245   Temp 36.4  C (97.5  F) 09/17/24 1028   Pulse 92 09/17/24 1253   Resp 14 09/17/24 1253   SpO2 95 % 09/17/24 1254   Vitals shown include unfiled device data.    Electronically Signed By: Valeriy Damico MD  September 17, 2024  4:38 PM

## 2024-09-17 NOTE — ANESTHESIA PREPROCEDURE EVALUATION
Anesthesia Pre-Procedure Evaluation    Patient: Desiree Bean   MRN: [id number] : 1950        Procedure : Procedure(s):  LEFT REVERSE TOTAL SHOULDER ARTHROPLASTY          Past Medical History:   Diagnosis Date    Anxiety     Cataract     Diverticulosis of large intestine     Factor XII deficiency (H)     Gastroesophageal reflux disease with esophagitis     Hypertension     Irritable bowel syndrome     Migraine     Mixed incontinence urge and stress     Obese     Osteoarthritis     Primary osteoarthritis of both knees     Sleep apnea     Trigger finger, acquired     Varicose vein of leg       Past Surgical History:   Procedure Laterality Date    ARTHROSCOPY SHOULDER ROTATOR CUFF REPAIR Right 2023    Procedure: Right shoulder rotator cuff repair with subacromial decompression and right distal clavical excision;  Surgeon: Ford Delgado MD;  Location: [facility] OR    CATARACT EXTRACTION Bilateral     EYE SURGERY Right     retinal surgery    ORTHOPEDIC SURGERY Bilateral     total knee arthroplasty- right , left       Allergies   Allergen Reactions    Oxycodone Nausea and GI Disturbance    Cephalexin Rash    Penicillins Headache and Hives      Social History     Tobacco Use    Smoking status: Never    Smokeless tobacco: Never   Substance Use Topics    Alcohol use: Yes     Comment: rare      Wt Readings from Last 1 Encounters:   24 124.3 kg (274 lb)        Anesthesia Evaluation   Pt has had prior anesthetic.     No history of anesthetic complications       ROS/MED HX  ENT/Pulmonary:     (+) sleep apnea, uses CPAP,                                   (-) tobacco use and recent URI   Neurologic:     (+)      migraines,                       (-) no seizures, no CVA and no TIA   Cardiovascular:     (+)  hypertension- -   -  - -                                   (-) arrhythmias   METS/Exercise Tolerance: 3 - Able to walk 1-2 blocks without stopping    Hematologic: Comments: Factor XII deficiency    "   Musculoskeletal:   (+)  arthritis (B/l TKAs 2018, 2021),             GI/Hepatic:     (+) GERD (rare),                (-) liver disease   Renal/Genitourinary:    (-) renal disease   Endo:     (+)               Obesity (BMI 48),       Psychiatric/Substance Use:     (+) psychiatric history anxiety       Infectious Disease:    (-) Recent Fever   Malignancy:       Other:            Physical Exam    Airway        Mallampati: II   TM distance: > 3 FB   Neck ROM: full   Mouth opening: > 3 cm    Respiratory Devices and Support         Dental       (+) Minor Abnormalities - some fillings, tiny chips      Cardiovascular          Rhythm and rate: regular and normal     Pulmonary           (+) decreased breath sounds           OUTSIDE LABS:  CBC: No results found for: \"WBC\", \"HGB\", \"HCT\", \"PLT\"  BMP:   Lab Results   Component Value Date    CR 0.56 08/02/2023     (H) 08/03/2023     (H) 08/02/2023     COAGS: No results found for: \"PTT\", \"INR\", \"FIBR\"  POC: No results found for: \"BGM\", \"HCG\", \"HCGS\"  HEPATIC: No results found for: \"ALBUMIN\", \"PROTTOTAL\", \"ALT\", \"AST\", \"GGT\", \"ALKPHOS\", \"BILITOTAL\", \"BILIDIRECT\", \"GALLO\"  OTHER: No results found for: \"PH\", \"LACT\", \"A1C\", \"DICK\", \"PHOS\", \"MAG\", \"LIPASE\", \"AMYLASE\", \"TSH\", \"T4\", \"T3\", \"CRP\", \"SED\"    Anesthesia Plan    ASA Status:  3    NPO Status:  NPO Appropriate    Anesthesia Type: General.     - Airway: LMA   Induction: Intravenous, Propofol.   Maintenance: Balanced.        Consents    Anesthesia Plan(s) and associated risks, benefits, and realistic alternatives discussed. Questions answered and patient/representative(s) expressed understanding.     - Discussed: Risks, Benefits and Alternatives for the PROCEDURE were discussed     - Discussed with:  Patient            Postoperative Care    Pain management: IV analgesics, Oral pain medications, Peripheral nerve block (Single Shot), Multi-modal analgesia.   PONV prophylaxis: Background Propofol Infusion, Ondansetron " "(or other 5HT-3), Dexamethasone or Solumedrol     Comments:    Other Comments: Brachial plexus block with exparel           Valeriy Damico MD    I have reviewed the pertinent notes and labs in the chart from the past 30 days and (re)examined the patient.  Any updates or changes from those notes are reflected in this note.              # Severe Obesity: Estimated body mass index is 50.12 kg/m  as calculated from the following:    Height as of this encounter: 1.575 m (5' 2\").    Weight as of this encounter: 124.3 kg (274 lb).      "

## 2024-09-17 NOTE — OR NURSING
Care assumed post nerve block placement.  See anesthesia record for procedure documentation. Pt taken into surgery immediately after the block.

## 2024-09-17 NOTE — OP NOTE
DATE OF SERVICE: 9/17/2024     SURGEON   TRISTIN JAMESON MD     FIRST ASSISTANT   JEWELL MORALES PA-C   Please bill for Mr. Morales as first assistant as there was no resident available to assist in this case. Assistants were necessary and performed positioning, draping, retraction, suturing and wound dressing tasks throughout the surgical procedure. The assistant was present for the entire procedure.    ASSISTANTS  YUE Alcocer    PREOPERATIVE DIAGNOSIS   End-stage left glenohumeral osteoarthritis with rotator cuff insufficency.     POSTOPERATIVE DIAGNOSIS   End-stage left glenohumeral osteoarthritis with rotator cuff insufficency.     PROCEDURE PERFORMED   1. Left reverse total shoulder arthroplasty using Tornier components, a size 25  glenoid baseplate with 20mm, 20mm, 20mm, and 29mm screws, a size 36 x 21 mm glenosphere, a size 2 stem, standard tray and 6 poylethylene.     INDICATION FOR SURGERY   Desiree Bean [id number] is an 74 year old-year-old female, with a long history of increasing left shoulder pain. We have failed all reasonable non-operative options as far as controlling the pain and improving function. The imaging shows advanced glenohumeral arthrosis with narrowing of the joint space and large marginal osteophytes with loss of rotator cuff function. After discussing with the patient, it was decided they would benefit from the above-named procedure. Informed consent was obtained.     ANESTHETIC   Interscalene block with general sedation.     FINDINGS   There was complete eburnation of both the glenoid and the humerus. The rotator cuff was not intact.     DESCRIPTION OF PROCEDURE   Desiree Bean was personally identified by me. The left upper extremity was marked. An indwelling interscalene catheter was placed. The patient was then taken to the operating room, where they were placed under MAC anesthetic. They were placed in low beach-chair position. They were then prepped and draped in  the usual fashion. A timeout was then performed. An incision was made from the coracoid down to the insertion of the deltoid after injection with anaesthetic solution. The deltopectoral fascia was identified. The deltopectoral interval was released, taking the cephalic vein medially, and cauterizing all the branches that attached to the deltoid. The deltoid retractor was placed in the subdeltoid space. The coracoacromial ligament was released. The conjoined tendon was palpated. The musculocutaneous nerve and axillary nerve were palpated and were well away from the site. The biceps was unroofed and tenotomized as far proximal as possible. The lesser tuberosity was identified and the inferior capsule with the subscapularis was peeled from the humerus. The arm was then placed in a shotgun position. Two Jessee retractors were placed along the inferior and medial humeral borders. There was a very large amount of osteophyte, which was removed using a rongeur and half-inch curved osteotome. The humeral cutting guide was placed and rotated to 20 degrees of external rotation. The humeral head cut was then made. The entry reamer was used to find the canal. We then started with the 1 reamer, then went up to a size 4 with excellent fit. The reamer was left in place. We then used the corresponding punch to ellis the medial cutout. Then the broaches were used until a 2 stem fit with good cortical contact. Then a planer was used on the humeral surface. The trial broach was left in. Attention was then turned to the glenoid. A Fukuda retractor was placed posteriorly and a Bankart retractor placed anteriorly with a small Yessi's retractor placed superiorly. A complete labrectomy was performed. The sizing guide was placed and the central hole was drilled. The face of the glenoid was then reamed down to a nice flat bed with bleeding bone. Peripheral reamers were used to clean off any bone and soft tissue around the glenoid. The  central hole was then drilled larger. We thoroughly irrigated and dried the glenoid. The permanent glenoid tray implant was impacted into place. Two compression screws were then implanted by drilling with a drill guide and using a depth gauge to assess screw length in the East and West positions of the glenoid. Screws were hand-tightened to ensure good fixation. Locking screws were implanted in the same fashion in the North and South positions. A peripheral reamer was then used to ensure no bone or soft tissue around the glenoid plate. Again the glenoid was thoroughly irrigated and dried. About half of the joint cocktail was injected around the glenoid. The permanent glenophere was then implanted onto the tray and the screw was hand-tightened. Attention was turned to the humerus again. We placed a trial tray and polyethylene on the humeral trial and reduced the shoulder. We felt that this configuration gave us good stability and proper tension of the deltoid and capsule. We removed the trial stem, tray and polyethylene and irrigated. We placed three #2 Force Fiber sutures through the bicipital groove. We placed about a third of the vancomycin powder down the humeral canal. The permanent stem was impacted into place. The stem was thoroughly dried, and the permanent tray was impacted into place. The permanent polyethylene insert was placed and impacted. The shoulder was then reduced. The subscapularis was repaired back to its bed with the #2 Force Fiber sutures that had been placed around the stem. The rest of the vancomycin power was placed in the subdeltoid space. The rotator interval was closed with interrupted #2 Force Fiber sutures. The deltopectoral interval was closed with interrupted #2 Force Fiber. The deep fatty layer was closed with 0 Stratafix suture, subdermal closed with 2-0 Stratafix suture, and skin closed with 3-0 Stratafix suture. The Prineo dressing was applied, then an ice pack and a shoulder sling  were placed. There were no complications. Sponge and needle counts were correct. The patient taken to [facility] in stable condition.     SPECIMENS  None    COMPLICATIONS  None    ESTIMATED BLOOD LOSS  50 mL    CONDITION ON DISCHARGE FROM OR  Satisfactory    PLAN  1. Antibiotic Prophylaxis was given included Ancef 2 gm. Antibiotics given within 1 hour of surgical incision and discontinued within 24 hrs.   2. DVT prophylaxis ASA given within 24 hours    3. Weight Bearing Non-weight bearing NWB.   4. Discharge anticipated date POD# 1 to Home    5. Pain Medication oxycodone, Tylenol and Celebrex    TRISTIN JAMESON MD      @C(1)@ [facility] - [phone]

## 2024-09-17 NOTE — ANESTHESIA PROCEDURE NOTES
Brachial plexus Procedure Note    Pre-Procedure   Staff -        Anesthesiologist:  Valeriy Damico MD       Performed By: anesthesiologist       Location: pre-op       Pre-Anesthestic Checklist: patient identified, IV checked, site marked, risks and benefits discussed, informed consent, monitors and equipment checked, pre-op evaluation, at physician/surgeon's request and post-op pain management  Timeout:       Correct Patient: Yes        Correct Procedure: Yes        Correct Site: Yes        Correct Position: Yes        Correct Laterality: Yes        Site Marked: Yes  Procedure Documentation  Procedure: Brachial plexus       Laterality: left       Patient Position: sitting       Patient Prep/Sterile Barriers: mask       Skin prep: Chloraprep       Local skin infiltrated with 1 mL of 1% lidocaine.  (superior trunk block approach).       Needle Type: insulated       Needle Gauge: 21.        Needle Length (Inches): 2        Ultrasound guided       1. Ultrasound was used to identify targeted nerve, plexus, vascular marker, or fascial plane and place a needle adjacent to it in real-time.       2. Ultrasound was used to visualize the spread of anesthetic in close proximity to the above referenced structure.       3. A permanent image is entered into the patient's record.       4. The visualized anatomic structures appeared normal.       5. There were no apparent abnormal pathologic findings.    Assessment/Narrative         The placement was negative for: blood aspirated, painful injection and site bleeding       Paresthesias: No.       Test dose of mL at.         Test dose negative, 3 minutes after injection, for signs of intravascular, subdural, or intrathecal injection.       Bolus given via needle..        Secured via.        Insertion/Infusion Method: Single Shot       Complications: none       Injection made incrementally with aspirations every 5 mL.    Medication(s) Administered   Bupivacaine 0.5% w/ 1:400K  "Epi (Injection) - Injection   10 mL - 9/17/2024 8:15:00 AM  Bupivacaine liposome (Exparel) 1.3% LA inj susp (Infiltration) - Infiltration   10 mL - 9/17/2024 8:15:00 AM   Comments:  Bolus via needle, 10 ml of 0.5% bupivacaine with 1:400,000 epinephrine mixed with 10cc 1.3% exparel.  Patient tolerated well, was mildly sedated but communicative throughout the procedure.    The surgeon has given a verbal order transferring care of this patient to me for the performance of regional analgesia block for post op pain control. It is requested of me because I am uniquely trained and qualified to perform this block and the surgeon is neither trained nor qualified to perform this procedure.         FOR [facility] ([facility]) ONLY:   Pain Team Contact information: please page the Pain Team Via [employer]. Search \"Pain\". During daytime hours, please page the attending first. At night please page the resident first.      "

## 2024-09-17 NOTE — PROGRESS NOTES
Patient vital signs are at baseline: Yes  Patient able to ambulate as they were prior to admission or with assist devices provided by therapies during their stay:  Yes, Up SBA with IV Poll  Patient MUST void prior to discharge:  Yes voiding in the BR, incontinent  Patient able to tolerate oral intake:  Yes  Pain has adequate pain control using Oral analgesics:  Yes  Does patient have an identified :  Yes  Has goal D/C date and time been discussed with patient:  Yes    Patient POD # 0 from LEFT REVERSE TOTAL SHOULDER ARTHROPLASTY, Liquid bandage with scant drain, Sling in place. CPAP at night. Pt c/o Nausea when up in the chair, Zofran given x1. Infusing LR at 75 mL/hr.

## 2024-09-17 NOTE — CONSULTS
"[facility]  Consult Note - Hospitalist Service     Date of Admission:  9/17/2024  Consult Requested by: orthopedics  Reason for Consult: medical management of comorbidities  PRIMARY CARE PROVIDER:    Lacy Johnson    Assessment & Plan   Desiree Bean is a 74 year old female with PMH of MIRELA, HTN, depression, overactive bladder, factor XII deficiency, migraines, obesity admitted to orthopedic service on 9/17/2024 for left reverse total shoulder arthroplasty. Hospitalist consult requested for medical management of comorbidities.    End stage left glenohumeral OA with rotator cuff insufficiency  S/p left reverse total shoulder arthoplasty 9/17/24  POD# 0. Surgery done by Dr. Delgado under interscalene block with general sedation, EBL 50 mL.  - Orthopedics service is managing.   --Defer analgesic/pain management, DVT prophylaxis, PT/OT  - HGB check on POD #1  - Encourage utilization of incentive spirometer.     HTN  */81  - Continue PTA Irbesartan  - BMP ordered for 9/18    Factor XII deficiency  *Can predispose to VTE  - Currently on  mg daily per orthopedics for DVT prophylaxis    Baseline severe MIRELA  - Continue CPAP at HS    Overactive bladder  - Continue PTA Ditropan XL    Chronic stable medical conditions  Obesity  IBS, diarrhea predominate  History of recurrent UTI  Depression  Migraines    Clinically Significant Risk Factors Present on Admission                  # Hypertension: Home medication list includes antihypertensive(s)         # Severe Obesity: Estimated body mass index is 50.12 kg/m  as calculated from the following:    Height as of this encounter: 1.575 m (5' 2\").    Weight as of this encounter: 124.3 kg (274 lb).                  Diet: Advance Diet as Tolerated: Regular Diet Adult     DVT Prophylaxis: Defer to primary service   Ma Catheter: Not present  Lines: None     Cardiac Monitoring: None  Code Status: Full Code      Disposition Plan       Expected " Discharge Date: 09/18/2024      Destination: home with family        Entered: Shasta Torres PA-C 09/17/2024, 1:53 PM        The patient's care was discussed with the Attending Physician, Dr. Wiggins and Patient.    The patient has been discussed with Dr. Wiggins, who agrees with the assessment and plan at this time.    At this time, I'd like to thank orthopedics for consulting the Hospitalist service.  We will continue to follow.        Shasta Torres PA-C  [facility]  Securely message with the Vocera Web Console (learn more here)      ______________________________________________________________________    Chief Complaint   S/p left shoulder surgery    History is obtained from the patient and EMR.    History of Present Illness   Desiree Bean is a 74 year old female with PMH of MIRELA, HTN, depression, overactive bladder, factor XII deficiency, migraines, obesity admitted to orthopedic service on 9/17/2024 for left reverse total shoulder arthroplasty. Hospitalist consult requested for medical management of comorbidities.    No chest pain, abdominal pain, nausea, vomiting. Her shoulder pain is currently controlled. Denies any numbness of the fingers and can wiggle them. States she does use CPAP at HS. Non smoker. Rare alcohol use.    Past Medical History    I have reviewed this patient's medical history and updated it with pertinent information if needed.   Past Medical History:   Diagnosis Date    Anxiety     Cataract     Diverticulosis of large intestine     Factor XII deficiency (H)     Gastroesophageal reflux disease with esophagitis     Hypertension     Irritable bowel syndrome     Migraine     Mixed incontinence urge and stress     Obese     Osteoarthritis     Primary osteoarthritis of both knees     Sleep apnea     Trigger finger, acquired     Varicose vein of leg        Medications   Prior to Admission Medications   Prescriptions Last Dose Informant Patient Reported?  Taking?   FLUoxetine (PROZAC) 20 MG capsule 9/16/2024 at AM Self Yes Yes   Sig: Take 20 mg by mouth daily   SUMAtriptan (IMITREX) 50 MG tablet More than a month at PRN Self Yes Yes   Sig: Take 50 mg by mouth at onset of headache for migraine   TURMERIC PO 9/9/2024 at AM Self Yes Yes   Sig: Take 1,000 mg by mouth daily as needed.   acetaminophen (8 HOUR ARTHRITIS PAIN) 650 MG CR tablet 9/16/2024 at AM Self Yes Yes   Sig: Take 1,300 mg by mouth every 8 hours as needed for mild pain or fever ([id number]).   cetirizine (ZYRTEC) 10 MG tablet 9/9/2024 at AM Self Yes No   Sig: Take 10 mg by mouth daily   chlorhexidine (PERIDEX) 0.12 % solution Past Week at PRN Self Yes Yes   Sig: Swish and spit 15 mLs in mouth every 48 hours as needed (gum disease, dry mouth)   clindamycin (CLEOCIN) 300 MG capsule Past Month at PRN Self Yes Yes   Sig: Take 300 mg by mouth once as needed (Dental appointment).   diclofenac (VOLTAREN) 1 % topical gel More than a month at PRN Self Yes Yes   Sig: Apply 2-4 g topically every 6 hours as needed for moderate pain (apply to shoulder)   estradiol (ESTRACE) 0.1 MG/GM vaginal cream 9/9/2024 at PM Self Yes Yes   Sig: Place vaginally daily.   fish oil-omega-3 fatty acids 1000 MG capsule 9/9/2024 at AM Self Yes Yes   Sig: Take 1 g by mouth daily.   irbesartan (AVAPRO) 75 MG tablet 9/16/2024 at AM Self Yes Yes   Sig: Take 75 mg by mouth daily   loperamide (IMODIUM) 2 MG capsule 9/15/2024 at PRN Self Yes Yes   Sig: Take 2 mg by mouth as needed for diarrhea.   nitroFURantoin macrocrystal (MACRODANTIN) 50 MG capsule 9/16/2024 at 1300 Self Yes Yes   Sig: Take 50 mg by mouth daily as needed (urinary tract infection)   oxybutynin ER (DITROPAN XL) 15 MG 24 hr tablet 9/16/2024 at AM Self Yes Yes   Sig: Take 15 mg by mouth daily      Facility-Administered Medications: None     Allergies   Allergies   Allergen Reactions    Oxycodone Nausea and GI Disturbance    Cephalexin Rash    Penicillins Headache and Hives        Physical Exam   Vital Signs: Temp: 97.8  F (36.6  C) Temp src: Oral BP: (!) 147/81 Pulse: 88   Resp: 18 SpO2: 94 % O2 Device: None (Room air) Oxygen Delivery: 2 LPM  Weight: 274 lbs 0 oz    Constitutional: Awake, alert, cooperative, no apparent distress.    ENT: Normocephalic, without obvious abnormality, atraumatic. Normal sclera.    Neck: Supple, symmetrical, trachea midline  Pulmonary: No increased work of breathing, diminished  Cardiovascular: Regular rate and rhythm  GI: Normal bowel sounds, soft, non-distended, non-tender.   Skin: Left shoulder incision c/d/i  Neuro: Oriented x 3. Moves all extremities spontaneously. No focal neuro deficits.  Psych:  Normal affect and mood  Extremities: Normal muscle tone. No gross deformities noted. LUE sling in place.    Medical Decision Making       40 MINUTES SPENT BY ME on the date of service doing chart review, history, exam, documentation & further activities per the note.         Data   I personally reviewed no images or EKG's today.

## 2024-09-17 NOTE — ANESTHESIA PROCEDURE NOTES
Airway       Patient location during procedure: OR  Staff -        CRNA: Autumn Gonzalez APRN CRNA       Performed By: CRNA  Consent for Airway        Urgency: elective  Indications and Patient Condition       Indications for airway management: guerrero-procedural         Mask difficulty assessment: 0 - not attempted    Final Airway Details       Final airway type: supraglottic airway    Supraglottic Airway Details        Type: LMA       Brand: Supreme       LMA size: 4    Post intubation assessment        Placement verified by: capnometry, equal breath sounds and chest rise        Number of attempts at approach: 1       Secured with: pink tape       Ease of procedure: easy       Dentition: Intact and Unchanged

## 2024-09-17 NOTE — ANESTHESIA CARE TRANSFER NOTE
Patient: Desiree Bean    Procedure: Procedure(s):  LEFT REVERSE TOTAL SHOULDER ARTHROPLASTY       Diagnosis: Osteoarthritis of left shoulder [M19.012]  Diagnosis Additional Information: No value filed.    Anesthesia Type:   General     Note:    Oropharynx: oropharynx clear of all foreign objects  Level of Consciousness: awake  Oxygen Supplementation: face mask    Independent Airway: airway patency satisfactory and stable  Dentition: dentition unchanged  Vital Signs Stable: post-procedure vital signs reviewed and stable  Report to RN Given: handoff report given  Patient transferred to: PACU    Handoff Report: Identifed the Patient, Identified the Reponsible Provider, Reviewed the pertinent medical history, Discussed the surgical course, Reviewed Intra-OP anesthesia mangement and issues during anesthesia, Set expectations for post-procedure period and Allowed opportunity for questions and acknowledgement of understanding            Electronically Signed By: DANTE Falk CRNA  September 17, 2024  10:25 AM

## 2024-09-18 ENCOUNTER — APPOINTMENT (OUTPATIENT)
Dept: OCCUPATIONAL THERAPY | Facility: CLINIC | Age: 74
End: 2024-09-18
Attending: PHYSICIAN ASSISTANT
Payer: COMMERCIAL

## 2024-09-18 VITALS
BODY MASS INDEX: 50.42 KG/M2 | RESPIRATION RATE: 16 BRPM | HEART RATE: 82 BPM | SYSTOLIC BLOOD PRESSURE: 116 MMHG | OXYGEN SATURATION: 96 % | HEIGHT: 62 IN | WEIGHT: 274 LBS | TEMPERATURE: 98.1 F | DIASTOLIC BLOOD PRESSURE: 64 MMHG

## 2024-09-18 LAB
ANION GAP SERPL CALCULATED.3IONS-SCNC: 11 MMOL/L (ref 7–15)
BUN SERPL-MCNC: 17.4 MG/DL (ref 8–23)
CALCIUM SERPL-MCNC: 8.9 MG/DL (ref 8.8–10.4)
CHLORIDE SERPL-SCNC: 100 MMOL/L (ref 98–107)
CREAT SERPL-MCNC: 0.74 MG/DL (ref 0.51–0.95)
EGFRCR SERPLBLD CKD-EPI 2021: 84 ML/MIN/1.73M2
ERYTHROCYTE [DISTWIDTH] IN BLOOD BY AUTOMATED COUNT: 12.8 % (ref 10–15)
GLUCOSE BLDC GLUCOMTR-MCNC: 102 MG/DL (ref 70–99)
GLUCOSE SERPL-MCNC: 93 MG/DL (ref 70–99)
HCO3 SERPL-SCNC: 25 MMOL/L (ref 22–29)
HCT VFR BLD AUTO: 34.4 % (ref 35–47)
HGB BLD-MCNC: 11 G/DL (ref 11.7–15.7)
MCH RBC QN AUTO: 28.6 PG (ref 26.5–33)
MCHC RBC AUTO-ENTMCNC: 32 G/DL (ref 31.5–36.5)
MCV RBC AUTO: 90 FL (ref 78–100)
PLATELET # BLD AUTO: 276 10E3/UL (ref 150–450)
POTASSIUM SERPL-SCNC: 3.9 MMOL/L (ref 3.4–5.3)
RBC # BLD AUTO: 3.84 10E6/UL (ref 3.8–5.2)
SODIUM SERPL-SCNC: 136 MMOL/L (ref 135–145)
WBC # BLD AUTO: 11.6 10E3/UL (ref 4–11)

## 2024-09-18 PROCEDURE — 99214 OFFICE O/P EST MOD 30 MIN: CPT | Performed by: HOSPITALIST

## 2024-09-18 PROCEDURE — 97165 OT EVAL LOW COMPLEX 30 MIN: CPT | Mod: GO

## 2024-09-18 PROCEDURE — 85027 COMPLETE CBC AUTOMATED: CPT | Performed by: PHYSICIAN ASSISTANT

## 2024-09-18 PROCEDURE — 36415 COLL VENOUS BLD VENIPUNCTURE: CPT | Performed by: PHYSICIAN ASSISTANT

## 2024-09-18 PROCEDURE — 250N000013 HC RX MED GY IP 250 OP 250 PS 637: Performed by: PHYSICIAN ASSISTANT

## 2024-09-18 PROCEDURE — 97110 THERAPEUTIC EXERCISES: CPT | Mod: GO

## 2024-09-18 PROCEDURE — 82962 GLUCOSE BLOOD TEST: CPT

## 2024-09-18 PROCEDURE — 97535 SELF CARE MNGMENT TRAINING: CPT | Mod: GO

## 2024-09-18 PROCEDURE — 80048 BASIC METABOLIC PNL TOTAL CA: CPT | Performed by: PHYSICIAN ASSISTANT

## 2024-09-18 PROCEDURE — 250N000013 HC RX MED GY IP 250 OP 250 PS 637: Performed by: INTERNAL MEDICINE

## 2024-09-18 RX ORDER — ACETAMINOPHEN 325 MG/1
650 TABLET ORAL EVERY 4 HOURS PRN
Qty: 100 TABLET | Refills: 0 | Status: SHIPPED | OUTPATIENT
Start: 2024-09-18

## 2024-09-18 RX ORDER — TRAMADOL HYDROCHLORIDE 50 MG/1
50-100 TABLET ORAL EVERY 6 HOURS PRN
Qty: 40 TABLET | Refills: 0 | Status: SHIPPED | OUTPATIENT
Start: 2024-09-18

## 2024-09-18 RX ORDER — LANOLIN ALCOHOL/MO/W.PET/CERES
3 CREAM (GRAM) TOPICAL
Status: DISCONTINUED | OUTPATIENT
Start: 2024-09-18 | End: 2024-09-18 | Stop reason: HOSPADM

## 2024-09-18 RX ORDER — CELECOXIB 100 MG/1
100 CAPSULE ORAL DAILY
Qty: 30 CAPSULE | Refills: 0 | Status: SHIPPED | OUTPATIENT
Start: 2024-09-18

## 2024-09-18 RX ORDER — HYDROXYZINE HYDROCHLORIDE 10 MG/1
10 TABLET, FILM COATED ORAL EVERY 6 HOURS PRN
Qty: 30 TABLET | Refills: 0 | Status: SHIPPED | OUTPATIENT
Start: 2024-09-18

## 2024-09-18 RX ORDER — ASPIRIN 81 MG/1
162 TABLET ORAL DAILY
Qty: 120 TABLET | Refills: 0 | Status: SHIPPED | OUTPATIENT
Start: 2024-09-18

## 2024-09-18 RX ORDER — AMOXICILLIN 250 MG
1-2 CAPSULE ORAL 2 TIMES DAILY
Qty: 100 TABLET | Refills: 0 | Status: SHIPPED | OUTPATIENT
Start: 2024-09-18

## 2024-09-18 RX ADMIN — POLYETHYLENE GLYCOL 3350 17 G: 17 POWDER, FOR SOLUTION ORAL at 08:27

## 2024-09-18 RX ADMIN — IRBESARTAN 75 MG: 75 TABLET ORAL at 08:27

## 2024-09-18 RX ADMIN — SENNOSIDES AND DOCUSATE SODIUM 1 TABLET: 50; 8.6 TABLET ORAL at 08:27

## 2024-09-18 RX ADMIN — TRAMADOL HYDROCHLORIDE 50 MG: 50 TABLET, COATED ORAL at 11:24

## 2024-09-18 RX ADMIN — FLUOXETINE HYDROCHLORIDE 20 MG: 20 CAPSULE ORAL at 08:27

## 2024-09-18 RX ADMIN — ASPIRIN 162 MG: 81 TABLET, COATED ORAL at 08:28

## 2024-09-18 RX ADMIN — OXYBUTYNIN CHLORIDE 15 MG: 10 TABLET, EXTENDED RELEASE ORAL at 08:28

## 2024-09-18 RX ADMIN — TRAMADOL HYDROCHLORIDE 50 MG: 50 TABLET, COATED ORAL at 03:58

## 2024-09-18 RX ADMIN — HYDROXYZINE HYDROCHLORIDE 10 MG: 10 TABLET ORAL at 04:57

## 2024-09-18 RX ADMIN — MELATONIN TAB 3 MG 3 MG: 3 TAB at 00:53

## 2024-09-18 RX ADMIN — ACETAMINOPHEN 975 MG: 325 TABLET ORAL at 08:27

## 2024-09-18 RX ADMIN — CELECOXIB 100 MG: 100 CAPSULE ORAL at 08:27

## 2024-09-18 ASSESSMENT — ACTIVITIES OF DAILY LIVING (ADL)
ADLS_ACUITY_SCORE: 27

## 2024-09-18 NOTE — PROGRESS NOTES
"[facility]  Hospitalist Progress Note        Aki Wiggins MD   09/18/2024        Interval History:        -No acute issues overnight; pain adequately controlled; denies any active complaints  -planned for discharge home today by orthopedics         Assessment and Plan:        Desiree Bean is a 74 year old female with PMH of MIRELA, HTN, depression, overactive bladder, factor XII deficiency, migraines, obesity admitted to orthopedic service on 9/17/2024 for left reverse total shoulder arthroplasty. Hospitalist consult requested for medical management of comorbidities.     End stage left glenohumeral OA with rotator cuff insufficiency  S/p left reverse total shoulder arthoplasty 9/17/24  - Surgery done by Dr. Delgado under interscalene block with general sedation, EBL 50 mL.  -Postop cares including pain control and DVT prophylaxis per orthopedics  - post op Hb 11.6     HTN  - BP controlled; Continue PTA Irbesartan     Factor XII deficiency  *Can predispose to VTE  - Currently on  mg daily per orthopedics for DVT prophylaxis     Baseline severe MIRELA  - Continue CPAP at HS     Overactive bladder  - Continue PTA Ditropan XL     Chronic stable medical conditions  Obesity  IBS, diarrhea predominate  History of recurrent UTI-- takes nitrofurantoin PRN  Depression  Migraines    Diet: Advance Diet as Tolerated: Regular Diet Adult      DVT Prophylaxis: on aspirin 162 mg daily per orthopedics    Code status: full code    Disposition:   Medically Ready for Discharge: Anticipated Today per orthopedics       Clinically Significant Risk Factors Present on Admission                    # Hypertension: Home medication list includes antihypertensive(s)             # Severe Obesity: Estimated body mass index is 50.12 kg/m  as calculated from the following:    Height as of this encounter: 1.575 m (5' 2\").    Weight as of this encounter: 124.3 kg (274 lb).                                     Physical Exam:  " "    Blood pressure (!) 155/98, pulse 80, temperature 98.7  F (37.1  C), temperature source Oral, resp. rate 16, height 1.575 m (5' 2\"), weight 124.3 kg (274 lb), SpO2 96%.  Vitals:    09/17/24 0718   Weight: 124.3 kg (274 lb)     Vital Signs with Ranges  Temp:  [97.5  F (36.4  C)-98.7  F (37.1  C)] 98.7  F (37.1  C)  Pulse:  [74-95] 80  Resp:  [9-19] 16  BP: (115-165)/() 155/98  SpO2:  [93 %-98 %] 96 %  I/O's Last 24 hours  I/O last 3 completed shifts:  In: 1040 [P.O.:240; I.V.:800]  Out: 700 [Urine:700]    Constitutional: Alert, awake and oriented X 3; resting comfortably in no apparent distress       Oral cavity: Moist mucosa   Cardiovascular: Normal s1 s2, regular rate and rhythm, no murmur   Lungs: B/l clear to auscultation, no wheezes or crepitations   Abdomen: Soft, nt, nd, no guarding, rigidity or rebound; BS +   LE : No edema   Musculoskeletal/Neuro Power 5/5 in all extremities; No focal neurological deficits noted; left shoulder in postop brace   Psychiatry: normal mood and affect                Medications:        Current Facility-Administered Medications   Medication Dose Route Frequency Provider Last Rate Last Admin    acetaminophen (TYLENOL) tablet 975 mg  975 mg Oral Q8H Dany Morales PA-C   975 mg at 09/17/24 2330    aspirin EC tablet 162 mg  162 mg Oral Daily Dany Morales PA-C   162 mg at 09/17/24 1429    celecoxib (celeBREX) capsule 100 mg  100 mg Oral BID Dany Morales PA-C   100 mg at 09/17/24 2030    FLUoxetine (PROzac) capsule 20 mg  20 mg Oral Daily Shasta Torres PA-C        irbesartan (AVAPRO) tablet 75 mg  75 mg Oral Daily Shasta Torres PA-C        oxyBUTYnin ER (DITROPAN XL) 24 hr tablet 15 mg  15 mg Oral Daily Shasta Torres PA-C        polyethylene glycol (MIRALAX) Packet 17 g  17 g Oral Daily Dany Morales PA-C        senna-docusate (SENOKOT-S/PERICOLACE) 8.6-50 MG per tablet 1 tablet  1 tablet Oral BID Dany Morales PA-C   1 tablet at 09/17/24 " 2030    sodium chloride (PF) 0.9% PF flush 3 mL  3 mL Intracatheter Q8H Dany Morales PA-C   3 mL at 09/18/24 0705     PRN Meds:   Current Facility-Administered Medications   Medication Dose Route Frequency Provider Last Rate Last Admin    benzocaine-menthol (CHLORASEPTIC) 6-10 MG lozenge 1 lozenge  1 lozenge Buccal Q1H PRN Dany Morales PA-C        bisacodyl (DULCOLAX) suppository 10 mg  10 mg Rectal Daily PRN Dany Morales PA-C        diphenhydrAMINE (BENADRYL) elixir 12.5 mg  12.5 mg Oral Q6H PRN Dany Morales PA-C        hydrALAZINE (APRESOLINE) injection 10 mg  10 mg Intravenous Q6H PRN Shasta Torres PA-C        HYDROmorphone (DILAUDID) injection 0.2 mg  0.2 mg Intravenous Q2H PRN Dany Morales PA-C        Or    HYDROmorphone (DILAUDID) injection 0.4 mg  0.4 mg Intravenous Q2H PRN Dany Morales PA-C        hydrOXYzine HCl (ATARAX) tablet 10 mg  10 mg Oral Q6H PRN Dany Morales PA-C   10 mg at 09/18/24 0457    lidocaine (LMX4) cream   Topical Q1H PRN Dany Morales PA-C        lidocaine 1 % 0.1-1 mL  0.1-1 mL Other Q1H PRN Dany Morales PA-C        loperamide (IMODIUM) capsule 2 mg  2 mg Oral TID PRN Shsata Torres PA-C        magnesium hydroxide (MILK OF MAGNESIA) suspension 30 mL  30 mL Oral Daily PRN Dany Morales PA-C        melatonin tablet 3 mg  3 mg Oral At Bedtime PRN Ventura Patel MD   3 mg at 09/18/24 0053    naloxone (NARCAN) injection 0.2 mg  0.2 mg Intravenous Q2 Min PRN Ford Delgado MD        Or    naloxone (NARCAN) injection 0.4 mg  0.4 mg Intravenous Q2 Min PRN Ford Delgado MD        Or    naloxone (NARCAN) injection 0.2 mg  0.2 mg Intramuscular Q2 Min PRN Ford Delgado MD        Or    naloxone (NARCAN) injection 0.4 mg  0.4 mg Intramuscular Q2 Min PRN Ford Delgado MD        ondansetron (ZOFRAN ODT) ODT tab 4 mg  4 mg Oral Q6H PRN Dany Morales PA-C        Or    ondansetron (ZOFRAN) injection 4 mg  4 mg Intravenous Q6H PRN  "Dany Morales PA-C   4 mg at 09/17/24 1544    prochlorperazine (COMPAZINE) injection 5 mg  5 mg Intravenous Q6H PRN Dany Morales PA-C        Or    prochlorperazine (COMPAZINE) tablet 5 mg  5 mg Oral Q6H PRN Dany Morales PA-C        sodium chloride (PF) 0.9% PF flush 3 mL  3 mL Intracatheter q1 min prn Dany Morales PA-C        traMADol (ULTRAM) tablet  mg   mg Oral Q6H PRN Dany Morales PA-C   50 mg at 09/18/24 0358            Data:      All new lab and imaging data was reviewed.   No lab results found.   Recent Labs   Lab Test 08/03/23  0606 08/02/23  0630   CR  --  0.56   * 134*     No lab results found.    Invalid input(s): \"TROP\", \"TROPONINIES\"      "

## 2024-09-18 NOTE — PROGRESS NOTES
"ORTHOPEDIC UPPER EXTREMITY PROGRESS NOTE    POD# 1  Patient is a 74 year old female who underwent Procedure(s):  LEFT REVERSE TOTAL SHOULDER ARTHROPLASTY on 2024. Pain is well controlled. Tolerating medication well, no nausea or vomiting.  Discharge instructions reviewed.    Vitals:   Blood pressure 116/64, pulse 82, temperature 98.1  F (36.7  C), temperature source Oral, resp. rate 16, height 1.575 m (5' 2\"), weight 124.3 kg (274 lb), SpO2 96%.  Temp (24hrs), Av  F (36.7  C), Min:97.5  F (36.4  C), Max:98.7  F (37.1  C)      EXAM   The patient is awake, alert, and sitting up in chair eating breakfast .   Sensation is intact.  Digital Flexion/Extension maintained.   Brisk cap refill.   The incision is covered with prineo mesh .    Labs: No lab results found.    ASSESSMENT  S/p left reverse TSA   PLAN  1. DVT prophylaxis: aspirin  2. Weight Bearing NWB (Non weight bearing).  3. Wound Care leave undisturbed  4. Discharge anticipated date today Home with No Skilled Service  5. Cont Pain Control Tylenol and Tramadol  6. Continue with shoulder immobilizer.  Okay to remove for ADLs.  Okay for active range of motion for elbow, wrist and digits.    Renuka Randall PA-C  [facility]      "

## 2024-09-18 NOTE — PROGRESS NOTES
Pt alert and oriented X4. Denied SOB, CP, N/V. Able to repeat all discharge information for understanding. Pt left with all personal belongings, discharge instructions, and medications.

## 2024-09-18 NOTE — PLAN OF CARE
Goal Outcome Evaluation:    Pt. A&Ox4. VSS on RA. CMS intact. Regular diet. Up with x1. Voiding adequately, pt. Occasional incontinent wearing personal pads. No BM this shift. C/o of migraine, 7/10, administered sumatriptan with effective relief of migraine. L shoulder pain managed with Tramadol 50mg atarax, and tylenol. Liquid bandage on L shoulder incision. Sling on. Denies nausea, tolerating fluids, stopped IV fluids. CPAP overnight. Sat up chair most of evening.

## 2024-09-18 NOTE — PLAN OF CARE
Occupational Therapy Discharge Summary    Reason for therapy discharge:    All goals and outcomes met, no further needs identified.    Progress towards therapy goal(s). See goals on Care Plan in [facility] electronic health record for goal details.  Goals met    Therapy recommendation(s):    Rec follow up for exercise progression. Pt is ind at baseline. Currently needing some assist with dressing and donning/doffing sling. Ambulating w/o AD and SBA. Will have family staying with for a few days - as long as needed. Rec assist with driving, household IADLs, and shower safety, dressing, sling use.

## 2024-09-18 NOTE — PROGRESS NOTES
09/18/24 0943   Appointment Info   Signing Clinician's Name / Credentials (OT) Nupur Dowd, OTR/L   Rehab Comments (OT) [facility]   Quick Adds   Quick Adds Certification   Living Environment   People in Home alone   Current Living Arrangements condominium   Home Accessibility no concerns  (ramp, elevator)   Transportation Anticipated family or friend will provide   Living Environment Comments walk in shower, grab bars and shower chair. comfort height toilet. has cane from previous knee surgery. Will have assistance from granddaughter/family for the next few days - as long as needed.   Self-Care   Usual Activity Tolerance moderate   Current Activity Tolerance moderate   Equipment Currently Used at Home grab bar, tub/shower;raised toilet seat   Fall history within last six months no   Activity/Exercise/Self-Care Comment ind with ADLs. ambulate without AD at basline.   Instrumental Activities of Daily Living (IADL)   IADL Comments fully ind with IADLs, drives   General Information   Onset of Illness/Injury or Date of Surgery 09/17/24   Referring Physician Dany Morales PA-C   Patient/Family Therapy Goal Statement (OT) to return home   Additional Occupational Profile Info/Pertinent History of Current Problem Procedure: Procedure(s):  LEFT REVERSE TOTAL SHOULDER ARTHROPLASTY   Existing Precautions/Restrictions weight bearing   Left Upper Extremity (Weight-bearing Status) non weight-bearing (NWB)   Right Upper Extremity (Weight-bearing Status) full weight-bearing (FWB)   Left Lower Extremity (Weight-bearing Status) full weight-bearing (FWB)   Right Lower Extremity (Weight-bearing Status) full weight-bearing (FWB)   Cognitive Status Examination   Orientation Status orientation to person, place and time   Sensory   Sensory Quick Adds sensation intact   Pain Assessment   Patient Currently in Pain Yes, see Vital Sign flowsheet  (4/10)   Posture   Posture forward head position;protracted shoulders   Range of Motion  Comprehensive   Comment, General Range of Motion R UE WNL   Strength Comprehensive (MMT)   General Manual Muscle Testing (MMT) Assessment no strength deficits identified   Muscle Tone Assessment   Muscle Tone Quick Adds No deficits were identified   Coordination   Upper Extremity Coordination No deficits were identified   Transfers   Transfer Comments STS SBA   Balance   Balance Comments no overt LOB, SBA provided   Activities of Daily Living   BADL Assessment/Intervention bathing;upper body dressing;lower body dressing;grooming;toileting   Additional Documentation Comment, IADL Assessment/Training (Row)  (household ambulation)   Bathing Assessment/Intervention   [place] Level (Bathing) set up;minimum assist (75% patient effort)   Comment, (Bathing) per clinical judgement   Upper Body Dressing Assessment/Training   [place] Level (Upper Body Dressing) set up;moderate assist (50% patient effort)   Lower Body Dressing Assessment/Training   Comment, (Lower Body Dressing) inc time, SBA in sitting, but required mod A to pull up pants/underwear on L side   [place] Level (Lower Body Dressing) set up   Grooming Assessment/Training   [place] Level (Grooming)   (SBA)   Toileting   [place] Level (Toileting) modified independence  (SBA)   Clinical Impression   Criteria for Skilled Therapeutic Interventions Met (OT) Yes, treatment indicated   OT Diagnosis Dec ADL ind post op shoulder replacement   OT Problem List-Impairments impacting ADL problems related to;activity tolerance impaired;post-surgical precautions;pain   Assessment of Occupational Performance 3-5 Performance Deficits   Identified Performance Deficits func amb, toileting, bilateral UE g/h tasks, showering, dressing   Planned Therapy Interventions (OT) ADL retraining;IADL retraining;transfer training;home program guidelines;progressive activity/exercise;risk factor education;ROM   Clinical Decision Making Complexity (OT) problem focused  assessment/low complexity   Risk & Benefits of therapy have been explained evaluation/treatment results reviewed;care plan/treatment goals reviewed;risks/benefits reviewed;current/potential barriers reviewed;participants voiced agreement with care plan;participants included;patient   Clinical Impression Comments Pt is below ind baseline post op. Would benefit from skilled IP OT to address safety completing ADLs.   OT Total Evaluation Time   OT Eval, Low Complexity Minutes (56162) 5   Therapy Certification   Medical Diagnosis S/P reverse arthroplasty, L shoulder   Start of Care Date 09/18/24   Certification date from 09/18/24   Certification date to 09/18/24   OT Goals   Therapy Frequency (OT) One time eval and treatment   OT Predicted Duration/Target Date for Goal Attainment 09/18/24   OT Goals Hygiene/Grooming;Upper Body Dressing;Lower Body Dressing;Toilet Transfer/Toileting;OT Goal 1;OT Goal 2   OT: Hygiene/Grooming independent;supervision/stand-by assist;Goal Met   OT: Upper Body Dressing Minimal assist;within precautions;Goal Met   OT: Lower Body Dressing Moderate assist;Goal Met   OT: Toilet Transfer/Toileting Modified independent;Goal Met;Supervision/stand-by assist   OT: Goal 1 Pt will demonstrate ind with UE HEP post op  (goal met)   OT: Goal 2 Pt will ambulate distance of > 200 ft with SBA to simulate household distances for IADL tasks  (goal met)   Interventions   Interventions Quick Adds Self-Care/Home Management;Therapeutic Activity;Therapeutic Procedures/Exercise   Self-Care/Home Management   Self-Care/Home Mgmt/ADL, Compensatory, Meal Prep Minutes (58042) 18   Symptoms Noted During/After Treatment (Meal Preparation/Planning Training)   (tolerable pain levels)   Treatment Detail/Skilled Intervention Pt greeted for OT eval/treat, edu on role and pt receptive. Pt completed standing g/h tasks at sink with SBA, able to bilaterally use UEs with edu to use elbow, wrist, hand, but to limit shoulder mobility at  this time. Toileting with SBA/grab bar (has comfort height and object to hold onto at home). Ind with toilet hygiene. Needed assist to pull up pants on L side (family can assist). OT reviewed shower safety (has safe bathroom set up). Set up and inc time to don pants this date. Edu on donning UE with affected UE first. Needed mod A for task, will have assistance. Edu on how to don/doff sling provided. mod-max A needed. Sling difficult to adjust due to interference with pt's breast.   Therapeutic Procedures/Exercise   Therapeutic Procedure: strength, endurance, ROM, flexibillity minutes (68270) 10   Symptoms Noted During/After Treatment   (tolerable pain levels)   Treatment Detail/Skilled Intervention OT provided hand out and written instructions for codman's pendulum exercise and seated A/PROM exercises of elbow, wrist, and digits. Needed PROM at this time to facilitate elbow flexion/extension, otherwise all other motions availabe to pt. Codman's with inc cuing and SBA. no questions.   Therapeutic Activities   Therapeutic Activity Minutes (57778) 8   Symptoms noted during/after treatment fatigue   Treatment Detail/Skilled Intervention OT engaged pt in Counts include 234 beds at the [facility] amb to simulate household distance to ensure safe to discharge. Pt tolerated 200 ft distance, did need 2 x standing breaks during due to fatigue/ SOB. Inquired if this is pt's baseline and pt thought she feels this way due to not getting OOB much since surgery. Encouraged pt to get up every hr at home to maintain/progress endurance during recovery, verbalized agreement. No LOB during amb with SBA. Discussed if pt feels unsteady, could use cane (has at home). Returned to chair and all needs met. Time to monitor vitals (O2 stable on RA). BP low prior to activity, but no dizziness present. No further questions.   OT Discharge Planning   OT Plan D/C OT   OT Discharge Recommendation (DC Rec) other (see comments)  (defer to ortho)   OT Rationale for DC Rec Pt is ind at  baseline. Currently needing some assist with dressing and donning/doffing sling. Ambulating w/o AD and SBA. Will have family staying with for a few days - as long as needed. Rec assist with driving, household IADLs, and shower safety, dressing, sling use.   OT Brief overview of current status See above. Goals of therapy will be to address safe mobility and make recs for d/c to next level of care. Pt and RN will continue to follow all falls risk precautions as documented by RN staff while hospitalized.   OT Equipment Needed at Discharge   (all needs met)   Total Session Time   Timed Code Treatment Minutes 36   Total Session Time (sum of timed and untimed services) 41      [facility]  OUTPATIENT OCCUPATIONAL THERAPY  EVALUATION  PLAN OF TREATMENT FOR OUTPATIENT REHABILITATION  (COMPLETE FOR INITIAL CLAIMS ONLY)  Patient's Last Name, First Name, M.I.  YOB: 1950  GregsathishEleonora brownDesiree  RAYMUNDO                          Provider's Name  [facility] Medical Record No.  [id number]                             Onset Date:  09/17/24   Start of Care Date:  09/18/24   Type:     ___PT   _X_OT   ___SLP Medical Diagnosis:  S/P reverse arthroplasty, L shoulder                    OT Diagnosis:  Dec ADL ind post op shoulder replacement Visits from SOC:  1     See note for plan of treatment, functional goals and certification details    I CERTIFY THE NEED FOR THESE SERVICES FURNISHED UNDER        THIS PLAN OF TREATMENT AND WHILE UNDER MY CARE     (Physician co-signature of this document indicates review and certification of the therapy plan).

## 2024-10-01 ENCOUNTER — DOCUMENTATION ONLY (OUTPATIENT)
Dept: OTHER | Facility: CLINIC | Age: 74
End: 2024-10-01
Payer: COMMERCIAL

## 2024-10-06 ENCOUNTER — HEALTH MAINTENANCE LETTER (OUTPATIENT)
Age: 74
End: 2024-10-06

## 2024-10-10 ENCOUNTER — MEDICAL CORRESPONDENCE (OUTPATIENT)
Dept: HEALTH INFORMATION MANAGEMENT | Facility: CLINIC | Age: 74
End: 2024-10-10
Payer: COMMERCIAL

## 2024-10-14 ENCOUNTER — ANESTHESIA EVENT (OUTPATIENT)
Dept: SURGERY | Facility: CLINIC | Age: 74
DRG: 483 | End: 2024-10-14
Payer: COMMERCIAL

## 2024-10-14 ASSESSMENT — LIFESTYLE VARIABLES: TOBACCO_USE: 0

## 2024-10-14 ASSESSMENT — ENCOUNTER SYMPTOMS
SEIZURES: 0
DYSRHYTHMIAS: 0

## 2024-10-14 NOTE — ANESTHESIA PREPROCEDURE EVALUATION
Anesthesia Pre-Procedure Evaluation    Patient: Desiree Bean   MRN: [id number] : 1950        Procedure : Procedure(s):  left humerus open reduction internal fixation  and revision left reverse total shoulder arthroplasty          Past Medical History:   Diagnosis Date    Anxiety     Cataract     Diverticulosis of large intestine     Factor XII deficiency (H)     Gastroesophageal reflux disease with esophagitis     Hypertension     Irritable bowel syndrome     Migraine     Mixed incontinence urge and stress     Obese     Osteoarthritis     Primary osteoarthritis of both knees     Sleep apnea     Trigger finger, acquired     Varicose vein of leg       Past Surgical History:   Procedure Laterality Date    ARTHROSCOPY SHOULDER ROTATOR CUFF REPAIR Right 2023    Procedure: Right shoulder rotator cuff repair with subacromial decompression and right distal clavical excision;  Surgeon: Ford Delgado MD;  Location: [facility] OR    CATARACT EXTRACTION Bilateral     EYE SURGERY Right     retinal surgery    ORTHOPEDIC SURGERY Bilateral     total knee arthroplasty- right , left     REVERSE ARTHROPLASTY SHOULDER Left 2024    Procedure: LEFT REVERSE TOTAL SHOULDER ARTHROPLASTY;  Surgeon: Ford Delgado MD;  Location: [facility] OR      Allergies   Allergen Reactions    Oxycodone Nausea and GI Disturbance    Cephalexin Rash    Penicillins Headache and Hives      Social History     Tobacco Use    Smoking status: Never    Smokeless tobacco: Never   Substance Use Topics    Alcohol use: Yes     Comment: one drink twice a month      Wt Readings from Last 1 Encounters:   24 124.3 kg (274 lb)        Anesthesia Evaluation   Pt has had prior anesthetic.     No history of anesthetic complications       ROS/MED HX  ENT/Pulmonary:     (+) sleep apnea, uses CPAP,                                   (-) tobacco use and recent URI   Neurologic:     (+)      migraines,                       (-) no seizures, no CVA and  "no TIA   Cardiovascular:     (+)  hypertension- -   -  - -                                   (-) arrhythmias   METS/Exercise Tolerance: 3 - Able to walk 1-2 blocks without stopping    Hematologic: Comments: Factor XII deficiency      Musculoskeletal: Comment: S/p Lt reverse TSA 9/17/24  (+)  arthritis (B/l TKAs 2018, 2021),             GI/Hepatic:     (+) GERD (rare),                (-) liver disease   Renal/Genitourinary:    (-) renal disease   Endo:     (+)               Obesity (BMI 48),       Psychiatric/Substance Use:     (+) psychiatric history anxiety       Infectious Disease:    (-) Recent Fever   Malignancy:       Other:            Physical Exam    Airway        Mallampati: II   TM distance: > 3 FB   Neck ROM: full   Mouth opening: > 3 cm    Respiratory Devices and Support         Dental       (+) Modest Abnormalities - crowns, retainers, 1 or 2 missing teeth      Cardiovascular   cardiovascular exam normal          Pulmonary           (+) decreased breath sounds           OUTSIDE LABS:  CBC:   Lab Results   Component Value Date    WBC 11.6 (H) 09/18/2024    HGB 11.0 (L) 09/18/2024    HCT 34.4 (L) 09/18/2024     09/18/2024     BMP:   Lab Results   Component Value Date     09/18/2024    POTASSIUM 3.9 09/18/2024    CHLORIDE 100 09/18/2024    CO2 25 09/18/2024    BUN 17.4 09/18/2024    CR 0.74 09/18/2024    CR 0.56 08/02/2023    GLC 93 09/18/2024     (H) 09/18/2024     COAGS: No results found for: \"PTT\", \"INR\", \"FIBR\"  POC: No results found for: \"BGM\", \"HCG\", \"HCGS\"  HEPATIC: No results found for: \"ALBUMIN\", \"PROTTOTAL\", \"ALT\", \"AST\", \"GGT\", \"ALKPHOS\", \"BILITOTAL\", \"BILIDIRECT\", \"GALLO\"  OTHER:   Lab Results   Component Value Date    DICK 8.9 09/18/2024       Anesthesia Plan    ASA Status:  3    NPO Status:  NPO Appropriate    Anesthesia Type: General.     - Airway: LMA   Induction: Intravenous, Propofol.   Maintenance: Balanced.        Consents    Anesthesia Plan(s) and associated risks, " "benefits, and realistic alternatives discussed. Questions answered and patient/representative(s) expressed understanding.     - Discussed:     - Discussed with:  Patient            Postoperative Care    Pain management: IV analgesics, Oral pain medications, Peripheral nerve block (Single Shot), Multi-modal analgesia.   PONV prophylaxis: Background Propofol Infusion, Ondansetron (or other 5HT-3), Dexamethasone or Solumedrol     Comments:               Yordy Zapata, DO, DO    I have reviewed the pertinent notes and labs in the chart from the past 30 days and (re)examined the patient.  Any updates or changes from those notes are reflected in this note.                       # Severe Obesity: Estimated body mass index is 50.12 kg/m  as calculated from the following:    Height as of 9/17/24: 1.575 m (5' 2\").    Weight as of 9/17/24: 124.3 kg (274 lb).             "

## 2024-10-15 ENCOUNTER — ANESTHESIA (OUTPATIENT)
Dept: SURGERY | Facility: CLINIC | Age: 74
DRG: 483 | End: 2024-10-15
Payer: COMMERCIAL

## 2024-10-15 ENCOUNTER — APPOINTMENT (OUTPATIENT)
Dept: GENERAL RADIOLOGY | Facility: CLINIC | Age: 74
DRG: 483 | End: 2024-10-15
Attending: PHYSICIAN ASSISTANT
Payer: COMMERCIAL

## 2024-10-15 ENCOUNTER — APPOINTMENT (OUTPATIENT)
Dept: GENERAL RADIOLOGY | Facility: CLINIC | Age: 74
DRG: 483 | End: 2024-10-15
Attending: ORTHOPAEDIC SURGERY
Payer: COMMERCIAL

## 2024-10-15 ENCOUNTER — HOSPITAL ENCOUNTER (INPATIENT)
Facility: CLINIC | Age: 74
LOS: 1 days | Discharge: HOME | DRG: 483 | End: 2024-10-16
Attending: ORTHOPAEDIC SURGERY | Admitting: ORTHOPAEDIC SURGERY
Payer: COMMERCIAL

## 2024-10-15 DIAGNOSIS — Z98.890 POST-OPERATIVE STATE: Primary | ICD-10-CM

## 2024-10-15 PROBLEM — Z96.612 HISTORY OF REVISION OF TOTAL REPLACEMENT OF LEFT SHOULDER JOINT: Status: ACTIVE | Noted: 2024-10-15

## 2024-10-15 LAB
CREAT SERPL-MCNC: 0.74 MG/DL (ref 0.51–0.95)
EGFRCR SERPLBLD CKD-EPI 2021: 84 ML/MIN/1.73M2
GLUCOSE SERPL-MCNC: 102 MG/DL (ref 70–99)
POTASSIUM SERPL-SCNC: 4.2 MMOL/L (ref 3.4–5.3)

## 2024-10-15 PROCEDURE — 258N000001 HC RX 258: Performed by: ORTHOPAEDIC SURGERY

## 2024-10-15 PROCEDURE — 360N000085 HC SURGERY LEVEL 5 W/ FLUORO, PER MIN: Performed by: ORTHOPAEDIC SURGERY

## 2024-10-15 PROCEDURE — 250N000009 HC RX 250: Performed by: STUDENT IN AN ORGANIZED HEALTH CARE EDUCATION/TRAINING PROGRAM

## 2024-10-15 PROCEDURE — 5A09357 ASSISTANCE WITH RESPIRATORY VENTILATION, LESS THAN 24 CONSECUTIVE HOURS, CONTINUOUS POSITIVE AIRWAY PRESSURE: ICD-10-PCS | Performed by: ORTHOPAEDIC SURGERY

## 2024-10-15 PROCEDURE — 99100 ANES PT EXTEME AGE<1 YR&>70: CPT | Performed by: NURSE ANESTHETIST, CERTIFIED REGISTERED

## 2024-10-15 PROCEDURE — 23473 REVIS RECONST SHOULDER JOINT: CPT | Performed by: NURSE ANESTHETIST, CERTIFIED REGISTERED

## 2024-10-15 PROCEDURE — 370N000017 HC ANESTHESIA TECHNICAL FEE, PER MIN: Performed by: ORTHOPAEDIC SURGERY

## 2024-10-15 PROCEDURE — 258N000003 HC RX IP 258 OP 636: Performed by: PHYSICIAN ASSISTANT

## 2024-10-15 PROCEDURE — 250N000011 HC RX IP 250 OP 636: Performed by: STUDENT IN AN ORGANIZED HEALTH CARE EDUCATION/TRAINING PROGRAM

## 2024-10-15 PROCEDURE — 0RRK00Z REPLACEMENT OF LEFT SHOULDER JOINT WITH REVERSE BALL AND SOCKET SYNTHETIC SUBSTITUTE, OPEN APPROACH: ICD-10-PCS | Performed by: ORTHOPAEDIC SURGERY

## 2024-10-15 PROCEDURE — 258N000003 HC RX IP 258 OP 636: Performed by: NURSE ANESTHETIST, CERTIFIED REGISTERED

## 2024-10-15 PROCEDURE — C1713 ANCHOR/SCREW BN/BN,TIS/BN: HCPCS | Performed by: ORTHOPAEDIC SURGERY

## 2024-10-15 PROCEDURE — 23473 REVIS RECONST SHOULDER JOINT: CPT | Performed by: ANESTHESIOLOGY

## 2024-10-15 PROCEDURE — 82947 ASSAY GLUCOSE BLOOD QUANT: CPT | Performed by: ANESTHESIOLOGY

## 2024-10-15 PROCEDURE — C9290 INJ, BUPIVACAINE LIPOSOME: HCPCS | Performed by: STUDENT IN AN ORGANIZED HEALTH CARE EDUCATION/TRAINING PROGRAM

## 2024-10-15 PROCEDURE — C1776 JOINT DEVICE (IMPLANTABLE): HCPCS | Performed by: ORTHOPAEDIC SURGERY

## 2024-10-15 PROCEDURE — 710N000009 HC RECOVERY PHASE 1, LEVEL 1, PER MIN: Performed by: ORTHOPAEDIC SURGERY

## 2024-10-15 PROCEDURE — 82565 ASSAY OF CREATININE: CPT | Performed by: ANESTHESIOLOGY

## 2024-10-15 PROCEDURE — 120N000013 HC R&B IMCU

## 2024-10-15 PROCEDURE — 250N000009 HC RX 250: Performed by: NURSE ANESTHETIST, CERTIFIED REGISTERED

## 2024-10-15 PROCEDURE — 250N000011 HC RX IP 250 OP 636: Performed by: ORTHOPAEDIC SURGERY

## 2024-10-15 PROCEDURE — 0RPK0J6 REMOVAL OF SYNTHETIC SUBSTITUTE FROM LEFT SHOULDER JOINT, HUMERAL SURFACE, OPEN APPROACH: ICD-10-PCS | Performed by: ORTHOPAEDIC SURGERY

## 2024-10-15 PROCEDURE — 999N000141 HC STATISTIC PRE-PROCEDURE NURSING ASSESSMENT: Performed by: ORTHOPAEDIC SURGERY

## 2024-10-15 PROCEDURE — 250N000011 HC RX IP 250 OP 636: Performed by: NURSE ANESTHETIST, CERTIFIED REGISTERED

## 2024-10-15 PROCEDURE — 999N000179 XR SURGERY CARM FLUORO LESS THAN 5 MIN W STILLS

## 2024-10-15 PROCEDURE — 36415 COLL VENOUS BLD VENIPUNCTURE: CPT | Performed by: ANESTHESIOLOGY

## 2024-10-15 PROCEDURE — 0PSG04Z REPOSITION LEFT HUMERAL SHAFT WITH INTERNAL FIXATION DEVICE, OPEN APPROACH: ICD-10-PCS | Performed by: ORTHOPAEDIC SURGERY

## 2024-10-15 PROCEDURE — P9045 ALBUMIN (HUMAN), 5%, 250 ML: HCPCS | Performed by: NURSE ANESTHETIST, CERTIFIED REGISTERED

## 2024-10-15 PROCEDURE — 250N000011 HC RX IP 250 OP 636: Performed by: PHYSICIAN ASSISTANT

## 2024-10-15 PROCEDURE — 250N000009 HC RX 250: Performed by: ORTHOPAEDIC SURGERY

## 2024-10-15 PROCEDURE — 250N000013 HC RX MED GY IP 250 OP 250 PS 637: Performed by: PHYSICIAN ASSISTANT

## 2024-10-15 PROCEDURE — 250N000025 HC SEVOFLURANE, PER MIN: Performed by: ORTHOPAEDIC SURGERY

## 2024-10-15 PROCEDURE — 272N000001 HC OR GENERAL SUPPLY STERILE: Performed by: ORTHOPAEDIC SURGERY

## 2024-10-15 PROCEDURE — 84132 ASSAY OF SERUM POTASSIUM: CPT | Performed by: ANESTHESIOLOGY

## 2024-10-15 PROCEDURE — 999N000065 XR SHOULDER LEFT PORT G/E 2 VIEWS: Mod: LT

## 2024-10-15 DEVICE — BONE CEMENT SIMPLEX FULL DOSE 6191-1-001: Type: IMPLANTABLE DEVICE | Site: SHOULDER | Status: FUNCTIONAL

## 2024-10-15 DEVICE — PTC PARTIALLY COATED DISTAL STEM
Type: IMPLANTABLE DEVICE | Site: SHOULDER | Status: FUNCTIONAL
Brand: TORNIER HRS

## 2024-10-15 DEVICE — CEMENT RESTRICTOR
Type: IMPLANTABLE DEVICE | Site: SHOULDER | Status: FUNCTIONAL
Brand: CEMENT RESTRICTOR

## 2024-10-15 DEVICE — IMPLANTABLE DEVICE
Type: IMPLANTABLE DEVICE | Site: SHOULDER | Status: FUNCTIONAL
Brand: TORNIER FLEX SHOULDER SYSTEM

## 2024-10-15 DEVICE — PTC PROXIMAL BODY
Type: IMPLANTABLE DEVICE | Site: SHOULDER | Status: FUNCTIONAL
Brand: TORNIER HRS

## 2024-10-15 DEVICE — ASSEMBLY SCREW
Type: IMPLANTABLE DEVICE | Site: SHOULDER | Status: FUNCTIONAL
Brand: TORNIER HRS

## 2024-10-15 DEVICE — LOCKING CAP
Type: IMPLANTABLE DEVICE | Site: SHOULDER | Status: FUNCTIONAL
Brand: TORNIER HRS

## 2024-10-15 RX ORDER — ONDANSETRON 2 MG/ML
4 INJECTION INTRAMUSCULAR; INTRAVENOUS EVERY 30 MIN PRN
Status: DISCONTINUED | OUTPATIENT
Start: 2024-10-15 | End: 2024-10-15 | Stop reason: HOSPADM

## 2024-10-15 RX ORDER — CELECOXIB 100 MG/1
100 CAPSULE ORAL 2 TIMES DAILY
Status: DISCONTINUED | OUTPATIENT
Start: 2024-10-16 | End: 2024-10-16 | Stop reason: HOSPADM

## 2024-10-15 RX ORDER — FENTANYL CITRATE 50 UG/ML
INJECTION, SOLUTION INTRAMUSCULAR; INTRAVENOUS PRN
Status: DISCONTINUED | OUTPATIENT
Start: 2024-10-15 | End: 2024-10-15

## 2024-10-15 RX ORDER — MAGNESIUM HYDROXIDE 1200 MG/15ML
LIQUID ORAL PRN
Status: DISCONTINUED | OUTPATIENT
Start: 2024-10-15 | End: 2024-10-15 | Stop reason: HOSPADM

## 2024-10-15 RX ORDER — LIDOCAINE HYDROCHLORIDE 20 MG/ML
INJECTION, SOLUTION INFILTRATION; PERINEURAL PRN
Status: DISCONTINUED | OUTPATIENT
Start: 2024-10-15 | End: 2024-10-15

## 2024-10-15 RX ORDER — DIPHENHYDRAMINE HCL 12.5MG/5ML
12.5 LIQUID (ML) ORAL EVERY 6 HOURS PRN
Status: DISCONTINUED | OUTPATIENT
Start: 2024-10-15 | End: 2024-10-16 | Stop reason: HOSPADM

## 2024-10-15 RX ORDER — HYDROMORPHONE HCL IN WATER/PF 6 MG/30 ML
0.4 PATIENT CONTROLLED ANALGESIA SYRINGE INTRAVENOUS EVERY 5 MIN PRN
Status: DISCONTINUED | OUTPATIENT
Start: 2024-10-15 | End: 2024-10-15 | Stop reason: HOSPADM

## 2024-10-15 RX ORDER — NALOXONE HYDROCHLORIDE 0.4 MG/ML
0.2 INJECTION, SOLUTION INTRAMUSCULAR; INTRAVENOUS; SUBCUTANEOUS
Status: DISCONTINUED | OUTPATIENT
Start: 2024-10-15 | End: 2024-10-16 | Stop reason: HOSPADM

## 2024-10-15 RX ORDER — ACETAMINOPHEN 325 MG/1
975 TABLET ORAL EVERY 8 HOURS
Status: DISCONTINUED | OUTPATIENT
Start: 2024-10-16 | End: 2024-10-16 | Stop reason: HOSPADM

## 2024-10-15 RX ORDER — PROPOFOL 10 MG/ML
INJECTION, EMULSION INTRAVENOUS CONTINUOUS PRN
Status: DISCONTINUED | OUTPATIENT
Start: 2024-10-15 | End: 2024-10-15

## 2024-10-15 RX ORDER — PROPOFOL 10 MG/ML
INJECTION, EMULSION INTRAVENOUS PRN
Status: DISCONTINUED | OUTPATIENT
Start: 2024-10-15 | End: 2024-10-15

## 2024-10-15 RX ORDER — FENTANYL CITRATE 0.05 MG/ML
25 INJECTION, SOLUTION INTRAMUSCULAR; INTRAVENOUS EVERY 5 MIN PRN
Status: DISCONTINUED | OUTPATIENT
Start: 2024-10-15 | End: 2024-10-15 | Stop reason: HOSPADM

## 2024-10-15 RX ORDER — DEXAMETHASONE SODIUM PHOSPHATE 4 MG/ML
4 INJECTION, SOLUTION INTRA-ARTICULAR; INTRALESIONAL; INTRAMUSCULAR; INTRAVENOUS; SOFT TISSUE
Status: DISCONTINUED | OUTPATIENT
Start: 2024-10-15 | End: 2024-10-15 | Stop reason: HOSPADM

## 2024-10-15 RX ORDER — ONDANSETRON 2 MG/ML
INJECTION INTRAMUSCULAR; INTRAVENOUS PRN
Status: DISCONTINUED | OUTPATIENT
Start: 2024-10-15 | End: 2024-10-15

## 2024-10-15 RX ORDER — CEFAZOLIN SODIUM/WATER 3 G/30 ML
3 SYRINGE (ML) INTRAVENOUS
Status: COMPLETED | OUTPATIENT
Start: 2024-10-15 | End: 2024-10-15

## 2024-10-15 RX ORDER — ASPIRIN 81 MG/1
162 TABLET ORAL DAILY
Status: DISCONTINUED | OUTPATIENT
Start: 2024-10-16 | End: 2024-10-16 | Stop reason: HOSPADM

## 2024-10-15 RX ORDER — LIDOCAINE 40 MG/G
CREAM TOPICAL
Status: DISCONTINUED | OUTPATIENT
Start: 2024-10-15 | End: 2024-10-15 | Stop reason: HOSPADM

## 2024-10-15 RX ORDER — CEFAZOLIN SODIUM/WATER 3 G/30 ML
3 SYRINGE (ML) INTRAVENOUS SEE ADMIN INSTRUCTIONS
Status: DISCONTINUED | OUTPATIENT
Start: 2024-10-15 | End: 2024-10-15 | Stop reason: HOSPADM

## 2024-10-15 RX ORDER — OXYCODONE HYDROCHLORIDE 5 MG/1
5 TABLET ORAL EVERY 4 HOURS PRN
Status: DISCONTINUED | OUTPATIENT
Start: 2024-10-15 | End: 2024-10-16

## 2024-10-15 RX ORDER — BISACODYL 10 MG
10 SUPPOSITORY, RECTAL RECTAL DAILY PRN
Status: DISCONTINUED | OUTPATIENT
Start: 2024-10-15 | End: 2024-10-16 | Stop reason: HOSPADM

## 2024-10-15 RX ORDER — NALOXONE HYDROCHLORIDE 0.4 MG/ML
0.4 INJECTION, SOLUTION INTRAMUSCULAR; INTRAVENOUS; SUBCUTANEOUS
Status: DISCONTINUED | OUTPATIENT
Start: 2024-10-15 | End: 2024-10-16 | Stop reason: HOSPADM

## 2024-10-15 RX ORDER — NALOXONE HYDROCHLORIDE 0.4 MG/ML
0.1 INJECTION, SOLUTION INTRAMUSCULAR; INTRAVENOUS; SUBCUTANEOUS
Status: DISCONTINUED | OUTPATIENT
Start: 2024-10-15 | End: 2024-10-15 | Stop reason: HOSPADM

## 2024-10-15 RX ORDER — ONDANSETRON 4 MG/1
4 TABLET, ORALLY DISINTEGRATING ORAL EVERY 6 HOURS PRN
Status: DISCONTINUED | OUTPATIENT
Start: 2024-10-15 | End: 2024-10-16 | Stop reason: HOSPADM

## 2024-10-15 RX ORDER — CELECOXIB 200 MG/1
400 CAPSULE ORAL ONCE
Status: COMPLETED | OUTPATIENT
Start: 2024-10-15 | End: 2024-10-15

## 2024-10-15 RX ORDER — PROCHLORPERAZINE MALEATE 5 MG/1
5 TABLET ORAL EVERY 6 HOURS PRN
Status: DISCONTINUED | OUTPATIENT
Start: 2024-10-15 | End: 2024-10-16 | Stop reason: HOSPADM

## 2024-10-15 RX ORDER — SODIUM CHLORIDE, SODIUM LACTATE, POTASSIUM CHLORIDE, CALCIUM CHLORIDE 600; 310; 30; 20 MG/100ML; MG/100ML; MG/100ML; MG/100ML
INJECTION, SOLUTION INTRAVENOUS CONTINUOUS
Status: DISCONTINUED | OUTPATIENT
Start: 2024-10-15 | End: 2024-10-16 | Stop reason: HOSPADM

## 2024-10-15 RX ORDER — SODIUM CHLORIDE, SODIUM LACTATE, POTASSIUM CHLORIDE, CALCIUM CHLORIDE 600; 310; 30; 20 MG/100ML; MG/100ML; MG/100ML; MG/100ML
INJECTION, SOLUTION INTRAVENOUS CONTINUOUS PRN
Status: DISCONTINUED | OUTPATIENT
Start: 2024-10-15 | End: 2024-10-15

## 2024-10-15 RX ORDER — DEXMEDETOMIDINE HYDROCHLORIDE 4 UG/ML
INJECTION, SOLUTION INTRAVENOUS PRN
Status: DISCONTINUED | OUTPATIENT
Start: 2024-10-15 | End: 2024-10-15

## 2024-10-15 RX ORDER — ACETAMINOPHEN 325 MG/1
975 TABLET ORAL ONCE
Status: COMPLETED | OUTPATIENT
Start: 2024-10-15 | End: 2024-10-15

## 2024-10-15 RX ORDER — ONDANSETRON 2 MG/ML
4 INJECTION INTRAMUSCULAR; INTRAVENOUS EVERY 6 HOURS PRN
Status: DISCONTINUED | OUTPATIENT
Start: 2024-10-15 | End: 2024-10-16 | Stop reason: HOSPADM

## 2024-10-15 RX ORDER — HYDROMORPHONE HCL IN WATER/PF 6 MG/30 ML
0.2 PATIENT CONTROLLED ANALGESIA SYRINGE INTRAVENOUS EVERY 5 MIN PRN
Status: DISCONTINUED | OUTPATIENT
Start: 2024-10-15 | End: 2024-10-15 | Stop reason: HOSPADM

## 2024-10-15 RX ORDER — HYDROMORPHONE HYDROCHLORIDE 1 MG/ML
INJECTION, SOLUTION INTRAMUSCULAR; INTRAVENOUS; SUBCUTANEOUS PRN
Status: DISCONTINUED | OUTPATIENT
Start: 2024-10-15 | End: 2024-10-15

## 2024-10-15 RX ORDER — LIDOCAINE 40 MG/G
CREAM TOPICAL
Status: DISCONTINUED | OUTPATIENT
Start: 2024-10-15 | End: 2024-10-16 | Stop reason: HOSPADM

## 2024-10-15 RX ORDER — DEXAMETHASONE SODIUM PHOSPHATE 4 MG/ML
INJECTION, SOLUTION INTRA-ARTICULAR; INTRALESIONAL; INTRAMUSCULAR; INTRAVENOUS; SOFT TISSUE PRN
Status: DISCONTINUED | OUTPATIENT
Start: 2024-10-15 | End: 2024-10-15

## 2024-10-15 RX ORDER — CEFAZOLIN SODIUM IN 0.9 % NACL 3 G/100 ML
3 INTRAVENOUS SOLUTION, PIGGYBACK (ML) INTRAVENOUS EVERY 8 HOURS
Status: COMPLETED | OUTPATIENT
Start: 2024-10-16 | End: 2024-10-16

## 2024-10-15 RX ORDER — SODIUM CHLORIDE, SODIUM LACTATE, POTASSIUM CHLORIDE, CALCIUM CHLORIDE 600; 310; 30; 20 MG/100ML; MG/100ML; MG/100ML; MG/100ML
INJECTION, SOLUTION INTRAVENOUS CONTINUOUS
Status: DISCONTINUED | OUTPATIENT
Start: 2024-10-15 | End: 2024-10-15 | Stop reason: HOSPADM

## 2024-10-15 RX ORDER — HYDROMORPHONE HCL IN WATER/PF 6 MG/30 ML
0.4 PATIENT CONTROLLED ANALGESIA SYRINGE INTRAVENOUS
Status: DISCONTINUED | OUTPATIENT
Start: 2024-10-15 | End: 2024-10-16 | Stop reason: HOSPADM

## 2024-10-15 RX ORDER — OXYCODONE HYDROCHLORIDE 5 MG/1
10 TABLET ORAL EVERY 4 HOURS PRN
Status: DISCONTINUED | OUTPATIENT
Start: 2024-10-15 | End: 2024-10-16

## 2024-10-15 RX ORDER — TRANEXAMIC ACID 650 MG/1
1950 TABLET ORAL ONCE
Status: DISCONTINUED | OUTPATIENT
Start: 2024-10-15 | End: 2024-10-15 | Stop reason: HOSPADM

## 2024-10-15 RX ORDER — VANCOMYCIN HYDROCHLORIDE 1 G/20ML
INJECTION, POWDER, LYOPHILIZED, FOR SOLUTION INTRAVENOUS PRN
Status: DISCONTINUED | OUTPATIENT
Start: 2024-10-15 | End: 2024-10-15 | Stop reason: HOSPADM

## 2024-10-15 RX ORDER — HYDROXYZINE HYDROCHLORIDE 10 MG/1
10 TABLET, FILM COATED ORAL EVERY 6 HOURS PRN
Status: DISCONTINUED | OUTPATIENT
Start: 2024-10-15 | End: 2024-10-16 | Stop reason: HOSPADM

## 2024-10-15 RX ORDER — ONDANSETRON 4 MG/1
4 TABLET, ORALLY DISINTEGRATING ORAL EVERY 30 MIN PRN
Status: DISCONTINUED | OUTPATIENT
Start: 2024-10-15 | End: 2024-10-15 | Stop reason: HOSPADM

## 2024-10-15 RX ORDER — FENTANYL CITRATE 0.05 MG/ML
50 INJECTION, SOLUTION INTRAMUSCULAR; INTRAVENOUS EVERY 5 MIN PRN
Status: DISCONTINUED | OUTPATIENT
Start: 2024-10-15 | End: 2024-10-15 | Stop reason: HOSPADM

## 2024-10-15 RX ORDER — AMOXICILLIN 250 MG
1 CAPSULE ORAL 2 TIMES DAILY
Status: DISCONTINUED | OUTPATIENT
Start: 2024-10-15 | End: 2024-10-16 | Stop reason: HOSPADM

## 2024-10-15 RX ORDER — HYDROMORPHONE HCL IN WATER/PF 6 MG/30 ML
0.2 PATIENT CONTROLLED ANALGESIA SYRINGE INTRAVENOUS
Status: DISCONTINUED | OUTPATIENT
Start: 2024-10-15 | End: 2024-10-16 | Stop reason: HOSPADM

## 2024-10-15 RX ORDER — POLYETHYLENE GLYCOL 3350 17 G/17G
17 POWDER, FOR SOLUTION ORAL DAILY
Status: DISCONTINUED | OUTPATIENT
Start: 2024-10-16 | End: 2024-10-16 | Stop reason: HOSPADM

## 2024-10-15 RX ADMIN — ONDANSETRON 4 MG: 2 INJECTION INTRAMUSCULAR; INTRAVENOUS at 19:52

## 2024-10-15 RX ADMIN — Medication 3 G: at 16:34

## 2024-10-15 RX ADMIN — DEXMEDETOMIDINE HYDROCHLORIDE 8 MCG: 200 INJECTION INTRAVENOUS at 17:44

## 2024-10-15 RX ADMIN — SENNOSIDES AND DOCUSATE SODIUM 1 TABLET: 50; 8.6 TABLET ORAL at 22:55

## 2024-10-15 RX ADMIN — PHENYLEPHRINE HYDROCHLORIDE 200 MCG: 10 INJECTION INTRAVENOUS at 18:00

## 2024-10-15 RX ADMIN — ONDANSETRON 4 MG: 2 INJECTION INTRAMUSCULAR; INTRAVENOUS at 16:34

## 2024-10-15 RX ADMIN — MIDAZOLAM 1 MG: 1 INJECTION INTRAMUSCULAR; INTRAVENOUS at 15:53

## 2024-10-15 RX ADMIN — BUPIVACAINE 10 ML: 13.3 INJECTION, SUSPENSION, LIPOSOMAL INFILTRATION at 15:52

## 2024-10-15 RX ADMIN — SODIUM CHLORIDE, POTASSIUM CHLORIDE, SODIUM LACTATE AND CALCIUM CHLORIDE: 600; 310; 30; 20 INJECTION, SOLUTION INTRAVENOUS at 16:10

## 2024-10-15 RX ADMIN — HYDROMORPHONE HYDROCHLORIDE 0.5 MG: 1 INJECTION, SOLUTION INTRAMUSCULAR; INTRAVENOUS; SUBCUTANEOUS at 17:02

## 2024-10-15 RX ADMIN — DEXMEDETOMIDINE HYDROCHLORIDE 12 MCG: 200 INJECTION INTRAVENOUS at 17:20

## 2024-10-15 RX ADMIN — Medication 15 ML: at 15:52

## 2024-10-15 RX ADMIN — PHENYLEPHRINE HYDROCHLORIDE 100 MCG: 10 INJECTION INTRAVENOUS at 19:51

## 2024-10-15 RX ADMIN — PHENYLEPHRINE HYDROCHLORIDE 100 MCG: 10 INJECTION INTRAVENOUS at 16:59

## 2024-10-15 RX ADMIN — TRANEXAMIC ACID 1 G: 1 INJECTION, SOLUTION INTRAVENOUS at 16:34

## 2024-10-15 RX ADMIN — LIDOCAINE HYDROCHLORIDE 40 MG: 20 INJECTION, SOLUTION INFILTRATION; PERINEURAL at 16:19

## 2024-10-15 RX ADMIN — PHENYLEPHRINE HYDROCHLORIDE 100 MCG: 10 INJECTION INTRAVENOUS at 18:31

## 2024-10-15 RX ADMIN — ACETAMINOPHEN 975 MG: 325 TABLET ORAL at 15:42

## 2024-10-15 RX ADMIN — PROPOFOL 25 MCG/KG/MIN: 10 INJECTION, EMULSION INTRAVENOUS at 16:34

## 2024-10-15 RX ADMIN — FENTANYL CITRATE 100 MCG: 50 INJECTION INTRAMUSCULAR; INTRAVENOUS at 16:19

## 2024-10-15 RX ADMIN — ALBUMIN (HUMAN): 12.5 SOLUTION INTRAVENOUS at 16:59

## 2024-10-15 RX ADMIN — PHENYLEPHRINE HYDROCHLORIDE 75 MCG: 10 INJECTION INTRAVENOUS at 18:45

## 2024-10-15 RX ADMIN — TRANEXAMIC ACID 1 G: 1 INJECTION, SOLUTION INTRAVENOUS at 19:40

## 2024-10-15 RX ADMIN — DEXAMETHASONE SODIUM PHOSPHATE 4 MG: 4 INJECTION, SOLUTION INTRA-ARTICULAR; INTRALESIONAL; INTRAMUSCULAR; INTRAVENOUS; SOFT TISSUE at 16:34

## 2024-10-15 RX ADMIN — CELECOXIB 400 MG: 200 CAPSULE ORAL at 15:42

## 2024-10-15 RX ADMIN — PHENYLEPHRINE HYDROCHLORIDE 0.2 MCG/KG/MIN: 10 INJECTION INTRAVENOUS at 16:31

## 2024-10-15 RX ADMIN — PHENYLEPHRINE HYDROCHLORIDE 100 MCG: 10 INJECTION INTRAVENOUS at 17:54

## 2024-10-15 RX ADMIN — SODIUM CHLORIDE, POTASSIUM CHLORIDE, SODIUM LACTATE AND CALCIUM CHLORIDE: 600; 310; 30; 20 INJECTION, SOLUTION INTRAVENOUS at 22:48

## 2024-10-15 RX ADMIN — PROPOFOL 200 MG: 10 INJECTION, EMULSION INTRAVENOUS at 16:19

## 2024-10-15 ASSESSMENT — ACTIVITIES OF DAILY LIVING (ADL)
ADLS_ACUITY_SCORE: 33
ADLS_ACUITY_SCORE: 31
ADLS_ACUITY_SCORE: 33

## 2024-10-15 NOTE — ANESTHESIA PROCEDURE NOTES
Brachial plexus (via superior trunk approach) Procedure Note    Pre-Procedure   Staff -        Anesthesiologist:  Ruben Washington MD       Performed By: anesthesiologist       Location: pre-op       Procedure Start/Stop Times: 10/15/2024 3:52 PM and 10/15/2024 3:57 PM       Pre-Anesthestic Checklist: patient identified, IV checked, site marked, risks and benefits discussed, informed consent, monitors and equipment checked, pre-op evaluation, at physician/surgeon's request and post-op pain management  Timeout:       Correct Patient: Yes        Correct Procedure: Yes        Correct Site: Yes        Correct Position: Yes        Correct Laterality: Yes        Site Marked: Yes  Procedure Documentation  Procedure: Brachial plexus (via superior trunk approach)       Laterality: left       Patient Position: supine       Skin prep: Chloraprep       Local skin infiltrated with 1 mL of 1% lidocaine.  (interscalene approach).       Needle Type: insulated       Needle Gauge: 21.        Needle Length (millimeters): 100        Ultrasound guided       1. Ultrasound was used to identify targeted nerve, plexus, vascular marker, or fascial plane and place a needle adjacent to it in real-time.       2. Ultrasound was used to visualize the spread of anesthetic in close proximity to the above referenced structure.       3. A permanent image is entered into the patient's record.       4. The visualized anatomic structures appeared normal.       5. There were no apparent abnormal pathologic findings.    Assessment/Narrative         The placement was negative for: blood aspirated, painful injection and site bleeding       Paresthesias: No.       Bolus given via needle..        Secured via.        Insertion/Infusion Method: Single Shot       Complications: none    Medication(s) Administered   Bupivacaine 0.5% w/ 1:400K Epi (Injection) - Injection   15 mL - 10/15/2024 3:52:00 PM  Bupivacaine liposome (Exparel) 1.3% LA inj susp  "(Infiltration) - Infiltration   10 mL - 10/15/2024 3:52:00 PM  Medication Administration Time: 10/15/2024 3:52 PM     Comments:  Bolus via needle, 10 mL of 0.5% bupivacaine with 1:400,000 epinephrine with 10ml of exparel.    Under ultrasound guidance, a 21 gauge needle was inserted and placed in close proximity to the brachial plexus via interscalene approach. Ultrasound was also used to visualize the spread of anesthetic in close proximity to the nerve being blocked. The nerve appeared anatomically normal, and there were no apparent abnormal pathological findings. Patient tolerated well, was mildly sedated but communicative throughout the procedure. A permanent ultrasound image was saved in the patient's record.    The surgeon has given a verbal order transferring care of this patient to me for the performance of regional analgesia block for post op pain control. It is requested of me because I am uniquely trained and qualified to perform this block and the surgeon is neither trained nor qualified to perform this procedure.         FOR [facility] ([facility]) ONLY:   Pain Team Contact information: please page the Pain Team Via Communication Intelligence. Search \"Pain\". During daytime hours, please page the attending first. At night please page the resident first.      "

## 2024-10-15 NOTE — PROGRESS NOTES
PTA medications completed by Medication Scribe day of surgery     Medication history sources: Patient, Surescripts, and H&P  In the past week, patient estimated taking medication this percent of the time: Greater than 90%      Significant changes made to the medication list:  Patient reports no longer taking the following meds (med scribe removed from PTA med list): Celebrex      Additional medication history information:   None    Medication reconciliation completed by provider prior to medication history? No    Time spent in this activity: 48 minutes    The information provided in this note is only as accurate as the sources available at the time of update(s)    Prior to Admission medications    Medication Sig Last Dose Taking? Auth Provider Long Term End Date   acetaminophen (TYLENOL) 325 MG tablet Take 2 tablets (650 mg) by mouth every 4 hours as needed for other (mild pain). Unknown at prn Yes Dany Morales PA-C No    aspirin 81 MG EC tablet Take 2 tablets (162 mg) by mouth daily. 10/10/2024 at am Yes Dany Morales PA-C     cetirizine (ZYRTEC) 10 MG tablet Take 10 mg by mouth daily 10/10/2024 at am Yes Reported, Patient     chlorhexidine (PERIDEX) 0.12 % solution Swish and spit 15 mLs in mouth every 48 hours as needed (gum disease, dry mouth) Unknown at prn Yes Reported, Patient     clindamycin (CLEOCIN) 300 MG capsule Take 300 mg by mouth once as needed (Dental appointment). More than a month at prn Yes Reported, Patient     diclofenac (VOLTAREN) 1 % topical gel Apply 2-4 g topically every 6 hours as needed for moderate pain (apply to shoulder). Do not apply to surgical site More than a month at prn Yes Renuka Randall PA-C     estradiol (ESTRACE) 0.1 MG/GM vaginal cream Place vaginally daily. Past Month at pm Yes Reported, Patient     fish oil-omega-3 fatty acids 1000 MG capsule Take 1 g by mouth daily. 10/10/2024 at am Yes Reported, Patient     FLUoxetine (PROZAC) 20 MG capsule Take 20 mg by mouth daily  10/10/2024 at am Yes Unknown, Entered By History No    hydrOXYzine HCl (ATARAX) 10 MG tablet Take 1 tablet (10 mg) by mouth every 6 hours as needed for other (adjuvant pain). Unknown at prn Yes Renuka Randall PA-C     irbesartan (AVAPRO) 75 MG tablet Take 75 mg by mouth daily 10/14/2024 at am Yes Reported, Patient Yes    loperamide (IMODIUM) 2 MG capsule Take 2 mg by mouth as needed for diarrhea. Unknown at prn Yes Reported, Patient     nitroFURantoin macrocrystal (MACRODANTIN) 100 MG capsule Take 100 mg by mouth daily as needed (urinary tract infection). More than a month at prn Yes Unknown, Entered By History     oxybutynin ER (DITROPAN XL) 15 MG 24 hr tablet Take 15 mg by mouth daily 10/13/2024 at am Yes Reported, Patient     senna-docusate (SENOKOT-S/PERICOLACE) 8.6-50 MG tablet Take 1-2 tablets by mouth 2 times daily. Take while on oral narcotics to prevent or treat constipation. Past Week at prn Yes Dany Morales PA-C     SUMAtriptan (IMITREX) 50 MG tablet Take 50 mg by mouth at onset of headache for migraine Unknown Yes Reported, Patient     traMADol (ULTRAM) 50 MG tablet Take 1-2 tablets ( mg) by mouth every 6 hours as needed for moderate to severe pain. Unknown at prn Yes Dany Morales PA-C     TURMERIC PO Take 400 mg by mouth every morning. 10/10/2024 at am Yes Reported, Patient       Medication history completed by: Trinh Hosikns LPN

## 2024-10-16 ENCOUNTER — APPOINTMENT (OUTPATIENT)
Dept: OCCUPATIONAL THERAPY | Facility: CLINIC | Age: 74
DRG: 483 | End: 2024-10-16
Attending: PHYSICIAN ASSISTANT
Payer: COMMERCIAL

## 2024-10-16 VITALS
RESPIRATION RATE: 16 BRPM | OXYGEN SATURATION: 93 % | HEART RATE: 88 BPM | SYSTOLIC BLOOD PRESSURE: 116 MMHG | DIASTOLIC BLOOD PRESSURE: 67 MMHG | TEMPERATURE: 97.3 F | HEIGHT: 62 IN | WEIGHT: 268.1 LBS | BODY MASS INDEX: 49.34 KG/M2

## 2024-10-16 PROBLEM — M81.0 AGE RELATED OSTEOPOROSIS: Status: ACTIVE | Noted: 2024-10-16

## 2024-10-16 LAB
GLUCOSE SERPL-MCNC: 118 MG/DL (ref 70–99)
HGB BLD-MCNC: 9.2 G/DL (ref 11.7–15.7)

## 2024-10-16 PROCEDURE — 82947 ASSAY GLUCOSE BLOOD QUANT: CPT | Performed by: ORTHOPAEDIC SURGERY

## 2024-10-16 PROCEDURE — 85018 HEMOGLOBIN: CPT | Performed by: PHYSICIAN ASSISTANT

## 2024-10-16 PROCEDURE — 258N000003 HC RX IP 258 OP 636: Performed by: PHYSICIAN ASSISTANT

## 2024-10-16 PROCEDURE — 250N000011 HC RX IP 250 OP 636: Performed by: PHYSICIAN ASSISTANT

## 2024-10-16 PROCEDURE — 250N000013 HC RX MED GY IP 250 OP 250 PS 637: Performed by: PHYSICIAN ASSISTANT

## 2024-10-16 PROCEDURE — 99222 1ST HOSP IP/OBS MODERATE 55: CPT | Performed by: INTERNAL MEDICINE

## 2024-10-16 PROCEDURE — 97165 OT EVAL LOW COMPLEX 30 MIN: CPT | Mod: GO

## 2024-10-16 PROCEDURE — 36415 COLL VENOUS BLD VENIPUNCTURE: CPT | Performed by: PHYSICIAN ASSISTANT

## 2024-10-16 PROCEDURE — 250N000013 HC RX MED GY IP 250 OP 250 PS 637: Performed by: INTERNAL MEDICINE

## 2024-10-16 RX ORDER — ASPIRIN 81 MG/1
162 TABLET ORAL DAILY
Qty: 120 TABLET | Refills: 0 | Status: SHIPPED | OUTPATIENT
Start: 2024-10-16

## 2024-10-16 RX ORDER — AMOXICILLIN 250 MG
1-2 CAPSULE ORAL 2 TIMES DAILY
Qty: 100 TABLET | Refills: 0 | Status: SHIPPED | OUTPATIENT
Start: 2024-10-16

## 2024-10-16 RX ORDER — HYDROXYZINE HYDROCHLORIDE 10 MG/1
10 TABLET, FILM COATED ORAL 3 TIMES DAILY PRN
Qty: 20 TABLET | Refills: 0 | Status: SHIPPED | OUTPATIENT
Start: 2024-10-16

## 2024-10-16 RX ORDER — CELECOXIB 100 MG/1
100 CAPSULE ORAL DAILY
Qty: 30 CAPSULE | Refills: 0 | Status: SHIPPED | OUTPATIENT
Start: 2024-10-16

## 2024-10-16 RX ORDER — SUMATRIPTAN 50 MG/1
50 TABLET, FILM COATED ORAL
Status: DISCONTINUED | OUTPATIENT
Start: 2024-10-16 | End: 2024-10-16 | Stop reason: HOSPADM

## 2024-10-16 RX ORDER — OXYCODONE HYDROCHLORIDE 5 MG/1
5 TABLET ORAL EVERY 4 HOURS PRN
Qty: 30 TABLET | Refills: 0 | Status: SHIPPED | OUTPATIENT
Start: 2024-10-16 | End: 2024-10-16

## 2024-10-16 RX ORDER — TRAMADOL HYDROCHLORIDE 50 MG/1
50 TABLET ORAL EVERY 6 HOURS PRN
Status: DISCONTINUED | OUTPATIENT
Start: 2024-10-16 | End: 2024-10-16 | Stop reason: HOSPADM

## 2024-10-16 RX ORDER — IRBESARTAN 75 MG/1
75 TABLET ORAL DAILY
Status: DISCONTINUED | OUTPATIENT
Start: 2024-10-16 | End: 2024-10-16 | Stop reason: HOSPADM

## 2024-10-16 RX ORDER — TRAMADOL HYDROCHLORIDE 50 MG/1
50 TABLET ORAL EVERY 6 HOURS PRN
Qty: 30 TABLET | Refills: 0 | Status: SHIPPED | OUTPATIENT
Start: 2024-10-16 | End: 2024-10-24

## 2024-10-16 RX ORDER — ACETAMINOPHEN 325 MG/1
650 TABLET ORAL EVERY 4 HOURS PRN
Qty: 100 TABLET | Refills: 0 | Status: SHIPPED | OUTPATIENT
Start: 2024-10-16

## 2024-10-16 RX ADMIN — POLYETHYLENE GLYCOL 3350 17 G: 17 POWDER, FOR SOLUTION ORAL at 08:10

## 2024-10-16 RX ADMIN — CELECOXIB 100 MG: 100 CAPSULE ORAL at 08:10

## 2024-10-16 RX ADMIN — IRBESARTAN 75 MG: 75 TABLET ORAL at 09:20

## 2024-10-16 RX ADMIN — SENNOSIDES AND DOCUSATE SODIUM 1 TABLET: 50; 8.6 TABLET ORAL at 08:10

## 2024-10-16 RX ADMIN — CEFAZOLIN 3 G: 10 INJECTION, POWDER, FOR SOLUTION INTRAVENOUS at 10:51

## 2024-10-16 RX ADMIN — ASPIRIN 162 MG: 81 TABLET, COATED ORAL at 08:10

## 2024-10-16 RX ADMIN — ACETAMINOPHEN 975 MG: 325 TABLET ORAL at 08:10

## 2024-10-16 RX ADMIN — CEFAZOLIN 3 G: 10 INJECTION, POWDER, FOR SOLUTION INTRAVENOUS at 00:53

## 2024-10-16 RX ADMIN — SUMATRIPTAN SUCCINATE 50 MG: 50 TABLET ORAL at 09:20

## 2024-10-16 RX ADMIN — ACETAMINOPHEN 975 MG: 325 TABLET ORAL at 00:12

## 2024-10-16 ASSESSMENT — ACTIVITIES OF DAILY LIVING (ADL)
ADLS_ACUITY_SCORE: 37

## 2024-10-16 NOTE — PLAN OF CARE
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  A&Ox4  Voiding WNL  Mobility IND  CMS numbness in LUE due to block  Lung Sounds clear on RA  GI WNL  Dressing WNL    Orders Placed This Encounter      Advance Diet as Tolerated: Regular Diet Adult      Discharge Instruction - Regular Diet Adult       Plan: Discharge today to home at 1205, ride provided by daughter. Discharge instructions and medications given. All questions answered pt verbalized understanding. Pt reported having all belongings.

## 2024-10-16 NOTE — PLAN OF CARE
Goal Outcome Evaluation:      A&Ox4, VSS on room air, wears CPAP while sleeping. L shoulder incision CDI, denies major pain, cold therapy applied, scheduled Tylenol given. Continue with plan of care.

## 2024-10-16 NOTE — ANESTHESIA POSTPROCEDURE EVALUATION
Patient: Desiree Bean    Procedure: Procedure(s):  left humerus open reduction internal fixation  and revision left reverse total shoulder arthroplasty       Anesthesia Type:  General    Note:  Disposition: Inpatient   Postop Pain Control: Uneventful            Sign Out: Well controlled pain   PONV:    Neuro/Psych: Uneventful            Sign Out: Acceptable/Baseline neuro status   Airway/Respiratory: Uneventful            Sign Out: Acceptable/Baseline resp. status   CV/Hemodynamics: Uneventful            Sign Out: Acceptable CV status   Other NRE: NONE   DID A NON-ROUTINE EVENT OCCUR? No    Event details/Postop Comments:  Recovery from regional anesthesia has not occurred but is expected within 48 hours.             Last vitals:  Vitals Value Taken Time   /71 10/15/24 2115   Temp 36.7  C (98  F) 10/15/24 2024   Pulse 81 10/15/24 2130   Resp 15 10/15/24 2128   SpO2 97 % 10/15/24 2130   Vitals shown include unfiled device data.    Electronically Signed By: Jordon Lindsey MD  October 15, 2024  9:32 PM

## 2024-10-16 NOTE — CONSULTS
"[facility]  Consult Note - Hospitalist Service  Date of Admission:  10/15/2024  Consult Requested by: Orthopedic surgeon, Dr Delgado  Reason for Consult: med co-management    Assessment & Plan   Desiree Bean is a 74 year old female admitted on 10/15/2024.     She is s/p L shoulder ORIF on 10/15/24.   3 weeks prior, she had elective Reverse Total shoulder Arthorplasty (~9/17/24).       On 10/9/24 She presented to outside ED with atraumatic L shoulder pain with spontaneous hematoma which started about 10/8/24.  .  Imaging (xray and then CT) revealed a Periprosthetic fracture at the proximal humerus.      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, rather than bleeding)    S/p L shoulder ORIF 10/15/24   S/p L Total shoulder reverse arthroplasty 9/17/24   Periprosthetic fracture, atraumatic 10/8/24    post op orders  (pain, activity, wound care, VTE prophyllaxis.) per orthopedic surgeon   2020 Dexa shows low bone mass (T - 2.2) , FRAX score 16%, 3.0%     HTN   - Resume pta losartan (hold for sBP <110)    Osteoporosis   - per FRAX score 2020, meets criterial for bone fragility  - patient plans to recheck DEXA in near future, has is scheduled  - patient will pursue treatment with PCP     MIRELA  - at home CPAP     Mild anemia   - since surgery 9/17, likely post op  - recheck @ PCP      FVII deficiency  - does not predispose to bleeding, but rather can predispose to VTE  - no h/o VTE       Okay for d/c from medical point of view.         Clinically Significant Risk Factors Present on Admission                 # Drug Induced Platelet Defect: home medication list includes an antiplatelet medication   # Hypertension: Home medication list includes antihypertensive(s)         # Severe Obesity: Estimated body mass index is 49.04 kg/m  as calculated from the following:    Height as of this encounter: 1.575 m (5' 2\").    Weight as of this " encounter: 121.6 kg (268 lb 1.6 oz).              Sharlene Ortiz MD  Hospitalist Service  Securely message with [employer] (more info)  Text page via [facility] Paging/Directory   ______________________________________________________________________    Chief Complaint   L shoulder pain with swelling starting about 10/8/24, no antecedent trauma    History is obtained from the patient and EPIC review     History of Present Illness   Desiree Bean is a 74 year old female who is s/p L shoulder ORIF on 10/15/24.   3 weeks prior, she had elective Reverse Total shoulder Arthorplasty (~9/17/24).       On 10/9/24 She presented to outside ED with atraumatic L shoulder pain with spontaneous hematoma which started about 10/8/24.  .  Imaging (xray and then CT) revealed a Periprosthetic fracture at the proximal humerus.        Past Medical History    Past Medical History:   Diagnosis Date    Anxiety     Cataract     Diverticulosis of large intestine     Factor XII deficiency (H)     Gastroesophageal reflux disease with esophagitis     Hypertension     Irritable bowel syndrome     Migraine     Mixed incontinence urge and stress     Obese     Osteoarthritis     Primary osteoarthritis of both knees     Sleep apnea     Trigger finger, acquired     Varicose vein of leg        Past Surgical History   Past Surgical History:   Procedure Laterality Date    ARTHROSCOPY SHOULDER ROTATOR CUFF REPAIR Right 08/01/2023    Procedure: Right shoulder rotator cuff repair with subacromial decompression and right distal clavical excision;  Surgeon: Ford Delgado MD;  Location:  OR    CATARACT EXTRACTION Bilateral     EYE SURGERY Right 2012    retinal surgery    ORTHOPEDIC SURGERY Bilateral     total knee arthroplasty- right 2018, left 2021    REVERSE ARTHROPLASTY SHOULDER Left 9/17/2024    Procedure: LEFT REVERSE TOTAL SHOULDER ARTHROPLASTY;  Surgeon: Ford Delgado MD;  Location:  OR       Medications   I have reviewed this patient's  current medications  and pta medications       Family History      sister has osteoporosis.  Mother had osteoporosis.   Osteoporosis: Sister and mom      Physical Exam   Vital Signs: Temp: 98.4  F (36.9  C) Temp src: Oral BP: 126/70 Pulse: 84   Resp: 16 SpO2: 96 % O2 Device: BiPAP/CPAP Oxygen Delivery: 2 LPM  Weight: 268 lbs 1.6 oz    Constitutional: awake, alert, cooperative, no apparent distress, and appears stated age  Eyes: Lids and lashes normal,  extra ocular muscles intact, sclera clear, conjunctiva normal  Respiratory: No increased work of breathing, good air exchange,    Musculoskeletal: L arm is in immobilizer/sling  no lower extremity pitting edema present  Neuropsychiatric: General: normal, calm, and normal eye contact  Affect: normal and pleasant  Memory and insight: normal, memory for past and recent events intact, and thought process normal    Medical Decision Making       65 MINUTES SPENT BY ME on the date of service doing chart review, history, exam, documentation & further activities per the note.      Data     I have personally reviewed the following data over the past 24 hrs:    N/A  \   N/A   / N/A     N/A N/A N/A /  102 (H)   4.2 N/A 0.74 \       Imaging results reviewed over the past 24 hrs:   Recent Results (from the past 24 hour(s))   XR Surgery COURTNEY Fluoro Less Than 5 Min w Stills    Narrative    EXAM: XR SURGERY COURTNEY FLUORO LESS THAN 5 MIN W STILLS  LOCATION: [facility]  DATE: 10/15/2024    INDICATION: Left Humerus ORIF + Revision Left Reverse Total Shoulder  COMPARISON: None.    TECHNIQUE: Intraoperative fluoroscopy performed during the patient's procedure.    RADIATION DOSE: Total Air Kerma 1.78 mGy    FINDINGS: 2 intraoperative fluoroscopic views of the left shoulder demonstrate a reverse left total shoulder arthroplasty in progress spanning a proximal humerus fracture. Please see the full operative report for details.   XR Shoulder Left Port G/E 2 Views     Narrative    EXAM: XR SHOULDER LEFT PORT G/E 2 VIEWS  LOCATION: [facility]  DATE: 10/15/2024    INDICATION: Status post surgery  COMPARISON: 10/15/2024 fluoroscopy.      Impression    IMPRESSION: Interval placement of longstem humeral component in the left reverse shoulder arthroplasty which traverses a proximal humeral shaft fracture. Degenerative changes of the acromioclavicular joint.

## 2024-10-16 NOTE — ANESTHESIA CARE TRANSFER NOTE
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    Anesthesia Type:   General     Note:    Oropharynx: oropharynx clear of all foreign objects and spontaneously breathing  Level of Consciousness: awake  Oxygen Supplementation: face mask  Level of Supplemental Oxygen (L/min / FiO2): 6  Independent Airway: airway patency satisfactory and stable  Dentition: dentition unchanged  Vital Signs Stable: post-procedure vital signs reviewed and stable  Report to RN Given: handoff report given  Patient transferred to: PACU    Handoff Report: Identifed the Patient, Identified the Reponsible Provider, Reviewed the pertinent medical history, Discussed the surgical course, Reviewed Intra-OP anesthesia mangement and issues during anesthesia, Set expectations for post-procedure period and Allowed opportunity for questions and acknowledgement of understanding      Vitals:  Vitals Value Taken Time   /91 10/15/24 2024   Temp     Pulse 80 10/15/24 2028   Resp 14 10/15/24 2028   SpO2 99 % 10/15/24 2028   Vitals shown include unfiled device data.    Electronically Signed By: DANTE Polanco CRNA  October 15, 2024  8:30 PM

## 2024-10-16 NOTE — PROGRESS NOTES
10/16/24 0841   Appointment Info   Signing Clinician's Name / Credentials (OT) Nupur Dowd OTR/L   Rehab Comments (OT) NWB L UE. Met with pt to review post op UE exercises/ADL safety and all questions answered Pt not receptive to activity at this time due to migrane. No further questions/OT needs at this time. Rec nursing ambulate with pt once pt able.   Living Environment   People in Home alone   Current Living Arrangements condominium   Home Accessibility no concerns  (ramp, elevator)   Transportation Anticipated family or friend will provide   Living Environment Comments walk in shower, grab bars and shower chair. comfort height toilet. has cane from previous knee surgery. Is hoping to have some help at discharge, but states granddaughter will need to return to work (did have assistance at discharge last surgery about a month ago)   Self-Care   Usual Activity Tolerance moderate   Current Activity Tolerance moderate   Equipment Currently Used at Home grab bar, toilet;grab bar, tub/shower;raised toilet seat   Fall history within last six months no   Activity/Exercise/Self-Care Comment ind with ADLs. ambulate without AD at [facility]. Shoulder surgery about 1 month ago.   Instrumental Activities of Daily Living (IADL)   IADL Comments fully ind with IADLs, drives   General Information   Onset of Illness/Injury or Date of Surgery 09/17/24   Referring Physician Dany Morales PA-C   Patient/Family Therapy Goal Statement (OT) to return home   Additional Occupational Profile Info/Pertinent History of Current Problem Procedure:      left humerus open reduction internal fixation, Left - Shoulder  and revision left reverse total shoulder arthroplasty, Left - Shoulder   Existing Precautions/Restrictions weight bearing   Left Upper Extremity (Weight-bearing Status) non weight-bearing (NWB)   Right Upper Extremity (Weight-bearing Status) full weight-bearing (FWB)   Left Lower Extremity (Weight-bearing Status) full  weight-bearing (FWB)   Right Lower Extremity (Weight-bearing Status) full weight-bearing (FWB)   General Observations and Info R hand dominant   Cognitive Status Examination   Orientation Status orientation to person, place and time   Pain Assessment   Patient Currently in Pain Yes, see Vital Sign flowsheet   Range of Motion Comprehensive   Comment, General Range of Motion R UE WNL, L UE limited postop   Clinical Impression   Criteria for Skilled Therapeutic Interventions Met (OT) Evaluation only   OT Total Evaluation Time   OT Eval, Low Complexity Minutes (23288) 9   OT Discharge Planning   OT Plan D/C OT - eval only   OT Discharge Recommendation (DC Rec) home with assist   OT Rationale for DC Rec Pt is ind at baseline, currently limited post op and due to migrane. Pt hoping to coordinate assist at discharge, recommended pt does so, as anticipate pt will need assist for dressing, donning/doffing sling, and household IADLs.   OT Brief overview of current status Goals of therapy will be to address safe mobility and make recs for d/c to next level of care. Pt and RN will continue to follow all falls risk precautions as documented by RN staff while hospitalized.   OT Equipment Needed at Discharge other (see comments)  (all equipment needs met)   Total Session Time   Total Session Time (sum of timed and untimed services) 9

## 2024-10-16 NOTE — PROGRESS NOTES
"ORTHOPEDIC UPPER EXTREMITY PROGRESS NOTE    POD# 1  Patient is a 74 year old female who underwent Procedure(s):  left humerus open reduction internal fixation  and revision left reverse total shoulder arthroplasty on 10/15/2024 on left. Pain is well controlled, has a migraine though.  Prefers Tramadol to oxycodone so changed as an inpatient and outpatient script adjusted. Patient discharge instructions reviewed and she expressed understanding.    Vitals:   /67 (BP Location: Right arm)   Pulse 88   Temp 97.3  F (36.3  C) (Oral)   Resp 16   Ht 1.575 m (5' 2\")   Wt 121.6 kg (268 lb 1.6 oz)   SpO2 93%   BMI 49.04 kg/m       EXAM   The patient is alert and awake.   Sensation is intact-still slightly diminished in block distribution.  Digital Flexion/Extension maintained.   Brisk cap refill.   The incision is covered, prineo CDI.     Labs:   Recent Labs   Lab Test 09/18/24  0834   HGB 11.0*     ASSESSMENT  S/p ORIF periprosthetic left reverse TSA     PLAN  1. Shoulder immobilizer  2. Weight Bearing NWB  3. Wound Care leave undisturbed  4. Discharge anticipated date today to home   5. Cont Pain Control Tramadol    Kjerstin Foss PA-C [facility]   "

## 2024-10-16 NOTE — PROGRESS NOTES
Pt arrived on the floor @ 2215. L shoulder incision CDI. On 3L NC sating btw 94-99%. Placed on 2L NC, now Cpap @ bedtime. Denies pain, but had some discomfort. Cool pack place close to incisional site for comfort. Numbness on L shoulder d/t block. LR infusing @ 75ml/hr. Incontinent, pure-wick place. Plan of care ongoing.

## 2024-10-18 ENCOUNTER — PATIENT OUTREACH (OUTPATIENT)
Dept: CARE COORDINATION | Facility: CLINIC | Age: 74
End: 2024-10-18
Payer: COMMERCIAL

## 2024-10-18 NOTE — PROGRESS NOTES
Connected Care Resource Center:   [facility] Contact  [facility]/Voicemail     Clinical Data: Post-Discharge Outreach     Outreach attempted x 2.  Left message on patient's voicemail, providing [facility]'s central phone number of [phone] ([phone]) for questions/concerns and/or to schedule an appt with an [facility] provider, if they do not have a PCP.      Plan:  [facility] will do no further outreaches at this time.       Noy Gilbert MA  Connected Care Resource Center, [facility]    *Connected Care Resource Team does NOT follow patient ongoing. Referrals are identified based on internal discharge reports and the outreach is to ensure patient has an understanding of their discharge instructions.

## 2024-11-06 NOTE — DISCHARGE SUMMARY
HOSPITAL DISCHARGE SUMMARY    Patient Name: Desiree Bean  YOB: 1950  Age: 74 year old  Medical Record Number: [id number]  Primary Physician: Lacy Johnson  Phone: [phone]  Admission Date: 10/15/2024   Discharge Date: 10/16/2024    She will be discharged from [facility] to Home with No Skilled Service      PRINCIPAL DISCHARGE DIAGNOSIS: Closed fracture of left proximal humerus Complication of internal prosthetic shoulder joint, left    <principal problem not specified>  Patient Active Problem List    Diagnosis Date Noted    Age related osteoporosis 10/16/2024     Priority: Medium    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 10/15/2024     Priority: Medium    Status post reverse arthroplasty of left shoulder 09/17/2024     Priority: Medium    Post-operative state 08/01/2023     Priority: Medium    Impingement syndrome of right shoulder 08/01/2023     Priority: Medium    Complete rotator cuff tear or rupture of right shoulder, not specified as traumatic 08/01/2023     Priority: Medium        PROCEDURES PERFORMED DURING HOSPITALIZATION:   left humerus open reduction internal fixation, Left - Shoulder  and revision left reverse total shoulder arthroplasty, Left - Shoulder    BRIEF HOSPITAL COURSE: Patient is a 74-year-old female 3-week status post left reverse total shoulder arthroplasty for severe glenohumeral arthritis and posterior wear. Initially she had did well however started getting increasing discomfort. She did not elicit any type of trauma to her arm. She was seen in urgent care where she was found to have a proximal humerus fracture.   The risks, benefits and possible complications of the above procedure were discussed with the patient. Patient elected to proceed to improve their lifestyle. Informed consent was obtained. For further details, please refer to the H&P in the chart.    The patient was admitted on 10/15/2024 and underwent the  "above-mentioned procedure. Patient tolerated this procedure well. Postoperatively, patient was seen in consultation by the internal medicine hospitalist service. Throughout the hospitalization, wound remained clean. The patient was started and advanced in a diet. CMS remained intact. Patient was seen and evaluated by occupational therapy. Hemoglobin was stable prior to discharge.    COMPLICATIONS IN HOSPITAL: None    PERTINENT FINDINGS/RESULTS AT DISCHARGE:   Blood pressure 116/67, pulse 88, temperature 97.3  F (36.3  C), temperature source Oral, resp. rate 16, height 1.575 m (5' 2\"), weight 121.6 kg (268 lb 1.6 oz), SpO2 93%.    Subjective: Patient feels good today.  Pain controlled. Discharge instructions reviewed.      EXAM   The patient is awake and alert  Sensation is intact.  Digital Flexion/Extension maintained.   Brisk cap refill.   The incision is covered      Latest Laboratory Results:  Chem:  Recent Labs   Lab Test 10/15/24  1551 09/18/24  0834   POTASSIUM 4.2 3.9     WBC/Hgb:  Recent Labs   Lab Test 10/16/24  0844 09/18/24  0834   WBC  --  11.6*   HGB 9.2* 11.0*     INR:  No results for input(s): \"INR\" in the last 70758 hours.  No components found for: \"[id number]\"    IMPORTANT PENDING TEST RESULTS: None    CONDITION AT DISCHARGE:   Stabilized    DISCHARGE ORDERS      Review of your medicines        UNREVIEWED medicines. Ask your doctor about these medicines        Dose / Directions   traMADol 50 MG tablet  Commonly known as: ULTRAM  Used for: Post-operative state  Ask about: Should I take this medication?      Dose: 50 mg  Take 1 tablet (50 mg) by mouth every 6 hours as needed for severe pain.  Quantity: 30 tablet  Refills: 0            START taking        Dose / Directions   celecoxib 100 MG capsule  Commonly known as: celeBREX  Used for: Post-operative state      Dose: 100 mg  Take 1 capsule (100 mg) by mouth daily. Do not take within 6 hours of ibuprofen (MOTRIN, ADVIL) or ketorolac (TORADOL) if " prescribed.  Quantity: 30 capsule  Refills: 0            CONTINUE these medicines which may have CHANGED, or have new prescriptions. If we are uncertain of the size of tablets/capsules you have at home, strength may be listed as something that might have changed.        Dose / Directions   hydrOXYzine HCl 10 MG tablet  Commonly known as: ATARAX  This may have changed:   when to take this  reasons to take this  Used for: Post-operative state      Dose: 10 mg  Take 1 tablet (10 mg) by mouth 3 times daily as needed for itching.  Quantity: 20 tablet  Refills: 0            CONTINUE these medicines which have NOT CHANGED        Dose / Directions   acetaminophen 325 MG tablet  Commonly known as: TYLENOL  Used for: Post-operative state      Dose: 650 mg  Take 2 tablets (650 mg) by mouth every 4 hours as needed for other (mild pain).  Quantity: 100 tablet  Refills: 0     aspirin 81 MG EC tablet  Used for: Post-operative state      Dose: 162 mg  Take 2 tablets (162 mg) by mouth daily.  Quantity: 120 tablet  Refills: 0     cetirizine 10 MG tablet  Commonly known as: zyrTEC      Dose: 10 mg  Take 10 mg by mouth daily  Refills: 0     chlorhexidine 0.12 % solution  Commonly known as: PERIDEX      Dose: 15 mL  Swish and spit 15 mLs in mouth every 48 hours as needed (gum disease, dry mouth)  Refills: 0     clindamycin 300 MG capsule  Commonly known as: CLEOCIN      Dose: 300 mg  Take 300 mg by mouth once as needed (Dental appointment).  Refills: 0     estradiol 0.1 MG/GM vaginal cream  Commonly known as: ESTRACE      Place vaginally daily.  Refills: 0     fish oil-omega-3 fatty acids 1000 MG capsule      Dose: 1 g  Take 1 g by mouth daily.  Refills: 0     FLUoxetine 20 MG capsule  Commonly known as: PROzac      Dose: 20 mg  Take 20 mg by mouth daily  Refills: 0     irbesartan 75 MG tablet  Commonly known as: AVAPRO      Dose: 75 mg  Take 75 mg by mouth daily  Refills: 0     loperamide 2 MG capsule  Commonly known as: IMODIUM       Dose: 2 mg  Take 2 mg by mouth as needed for diarrhea.  Refills: 0     nitroFURantoin macrocrystal 100 MG capsule  Commonly known as: MACRODANTIN      Dose: 100 mg  Take 100 mg by mouth daily as needed (urinary tract infection).  Refills: 0     oxyBUTYnin ER 15 MG 24 hr tablet  Commonly known as: DITROPAN XL      Dose: 15 mg  Take 15 mg by mouth daily  Refills: 0     senna-docusate 8.6-50 MG tablet  Commonly known as: SENOKOT-S/PERICOLACE  Used for: Post-operative state      Dose: 1-2 tablet  Take 1-2 tablets by mouth 2 times daily. Take while on oral narcotics to prevent or treat constipation.  Quantity: 100 tablet  Refills: 0     SUMAtriptan 50 MG tablet  Commonly known as: IMITREX      Dose: 50 mg  Take 50 mg by mouth at onset of headache for migraine  Refills: 0     TURMERIC PO      Dose: 400 mg  Take 400 mg by mouth every morning.  Refills: 0            STOP taking      diclofenac 1 % topical gel  Commonly known as: VOLTAREN        traMADol 50 MG tablet  Commonly known as: ULTRAM                  Where to get your medicines        These medications were sent to [facility], [place] - [address] [address]  [address] [address]      Phone: [phone]   acetaminophen 325 MG tablet  aspirin 81 MG EC tablet  celecoxib 100 MG capsule  hydrOXYzine HCl 10 MG tablet  senna-docusate 8.6-50 MG tablet  traMADol 50 MG tablet         [unfilled]  After Care Instructions       Activity - Exercises to prevent blood clots      Unless otherwise directed by your Surgeon team, perform the following exercises at least three times per day for the first four weeks after surgery to prevent blood clots in your legs: 1) Point and flex your feet (Ankle Pumps), 2) Move your ankle around in big circles, 3) Wiggle your toes, 4) Walk, even for short distances, several times a day, will help decrease the risk of blood clots.        Codman's Pendulum Exercise      Perform Codman's pendulum  exercises as instructed by your Physical Therapist.        Comfort and Pain Management - Cold therapy      Ice can be used to control swelling and discomfort after surgery. Place a thin towel over your operative site and apply the ice pack overtop. Leave ice pack in place for 20 minutes, then remove for 20 minutes. Repeat this 20 minutes on/20 minutes off routine as often as tolerated.        Comfort and Pain Management - Pain after Surgery      Pain after surgery is normal and expected.  You will have some amount of pain for several weeks after surgery.  Your pain will improve with time.  There are several things you can do to help reduce your pain including: rest, ice, elevation, and using pain medications as needed. Contact your Surgeon Team if you have pain that persists or worsens after surgery despite rest, ice, elevation, and taking your medication(s) as prescribed. Contact your Surgeon Team if you have new numbness, tingling, or weakness in your operative extremity.        Comfort and Pain Management - Swelling after Surgery      Swelling and/or bruising of the surgical extremity is common and may persist for several months after surgery. In addition to frequent icing and elevation, gentle compressive support with an ACE wrap or tubigrip may help with swelling. Apply compression regularly, removing at least twice daily to perform skin checks. Contact your Surgeon Team if your swelling increases and is NOT associated with an increase in your activity level, or if your swelling increases and is associated with redness and pain.        Comfort and Pain Management - UPPER extremity Elevation      Swelling is expected for several months after surgery. This type of swelling is usually associated with gravity and activity, and can be improved with elevation.   The best way to do this is to get your hand above your heart by sitting down, resting your elbow on a pillow or arm rest, with your hand in the air. Perform  this elevation as often as possible especially for the first two weeks after surgery        Discharge Instruction - Breathing exercises      Perform breathing exercises using your Incentive Spirometer 10 times per hour while awake for 2 weeks.        Discharge Instruction - Regular Diet Adult      Return to your pre-surgery diet unless instructed otherwise        Dressing / Wound Care - NO Tub Bathing      Tub bathing, swimming, or any other activities that will cause your incision to be submerged in water should be avoided until allowed by your Surgeon.        Dressing / Wound Care - Wound      You have a clean dressing on your surgical wound. Dressing change instructions as follows: dressing will be removed at your follow-up appointment. Contact your Surgeon Team if you have increased redness, warmth around the surgical wound, and/or drainage from the surgical wound.        Dressing Wound Care - Shower with wound/dressing NOT covered      You do not need to cover your dressing or incision in the shower, you may allow water and soap to run over top of the surgical dressing or incision. You may shower 2 days after surgery.  You are strictly prohibited from soaking or submerging the surgical wound underwater.        Elbow and Wrist range of motion      Active and passive elbow range of motion is permitted. Perform Fist pumps every hour while you are awake.        Medication Instructions - Acetaminophen (TYLENOL) Instructions      You were discharged with acetaminophen (TYLENOL) for pain management after surgery. Acetaminophen most effectively manages pain symptoms when it is taken on a schedule without missing doses (every four, six, or eight hours). Your Provider will prescribe a safe daily dose between 3000 - 4000 mg.  Do NOT exceed this daily dose. Most patients use acetaminophen for pain control for the first four weeks after surgery.  You can wean from this medication as your pain decreases.        Medication  Instructions - NSAID Instructions      You were discharged with an anti-inflammatory medication for pain management to use in combination with acetaminophen (TYLENOL) and the narcotic pain medication.  Take this medication exactly as directed.  You should only take one anti-inflammatory at a time.  Some common anti-inflammatories include: ibuprofen (ADVIL, MOTRIN), naproxen (ALEVE, NAPROSYN), celecoxib (CELEBREX), meloxicam (MOBIC), ketorolac (TORADOL).  Take this medication with food and water.        Medication Instructions - Opioids - Tapering Instructions      In the first three days following surgery, your symptoms may warrant use of the narcotic pain medication every four to six hours as prescribed. This is normal. As your pain symptoms improve, focus your efforts on decreasing (tapering) use of narcotic medications. The most successful tapering strategy is to first, decrease the number of tablets you take every 4-6 hours to the minimum prescribed. Then, increase the amount of time between doses. For example: First, taper to   or 1 tablet every 4-6 hours. Then, taper to   or 1 tablet every 6-8 hours. Then, taper to   or 1 tablet every 8-10 hours. Then, taper to   or 1 tablet every 10-12 hours. Then, taper to   or 1 tablet at bedtime. The bedtime dose can help with comfort during sleep and is typically the last dose to be discontinued after surgery.        Medication instructions -  Anticoagulation - aspirin      Take the aspirin as prescribed for a total of eight weeks after surgery.  This is given to help minimize your risk of blood clot.        NO weight bearing      No weight bearing on your operative extremity.        Opioid Instructions (Greater than or equal to 65 years)      You were discharged with an opioid medication (hydromorphone, oxycodone, hydrocodone, or tramadol). This medication should only be taken for breakthrough pain that is not controlled with acetaminophen (TYLENOL). If you rate your  pain less than 3 you do not need this medication. Pain rating 0-3: You do not need this medication Pain rating 4-6: Take 1/2 tablet every 4-6 hours as needed Pain rating 7-10: Take 1 tablet every 4-6 hours as needed Do not exceed 4 tablets per day        Return to Driving      Return to driving - Driving is NOT permitted until directed by your provider. Under no circumstance are you permitted to drive while using narcotic pain medications.        Symptoms - Constipation management      Constipation (hard, dry bowel movements) is expected after surgery due to the combination of being less active, the anesthetic, and the opioid pain medication.  You can do the following to help reduce constipation:  ~  FLUIDS:  Drink clear liquids (water or Gatorade), or juice (apple/prune).  ~  DIET:  Eat a fiber rich diet.    ~  ACTIVITY:  Get up and move around several times a day.  Increase your activity as you are able.  MEDICATIONS:  Reduce the risk of constipation by starting medications before you are constipated.  You can take Miralax   (1 packet as directed) and/or a stool softener (Senokot 1-2 tablets 1-2 times a day).  If you already have constipation and these medications are not working, you can get magnesium citrate and use as directed.  If you continue to have constipation you can try an over the counter suppository or enema.  Call your Surgeon Team if it has been greater than 3 days since your last bowel movement.        Symptoms - Fever Management      A low grade fever can be expected after surgery.  Use acetaminophen (TYLENOL) as needed for fever management.  Contact your Surgeon Team if you have a fever greater than 101.5 F, chills, and/or night sweats.        Symptoms - Reduced Urine Output      Changes in the amount of fluids you drank before and after surgery may result in problems urinating.  It is important to stay well-hydrated after surgery and drink plenty of water. If it has been greater than 8 hours  "since you have urinated despite drinking plenty of water, call your Surgeon Team.        When to call - Contact Surgeon Team      You may experience symptoms that require follow-up before your scheduled appointment. Refer to the \"Stoplight Tool\" for instructions on when to contact your Surgeon Team if you are concerned about pain control, blood clots, constipation, or if you are unable to urinate.        When to call - Reach out to Urgent Care      If you are not able to reach your Surgeon Team and you need immediate care, go to the Orthopedic Walk-in Clinic or Urgent Care at your Surgeon's office.  Do NOT go to the Emergency Room unless you have shortness of breath, chest pain, or other signs of a medical emergency.        When to call - Reasons to Call 911      Call 911 immediately if you experience sudden-onset chest pain, arm weakness/numbness, slurred speech, or shortness of breath                 After Discharge Recommendations:  1. Resume pre-admission diet  2. DVT prophylaxis: aspirin  3. Follow up: She should see Dr. Delgado in clinic in 11-14 days.  4. Sutures will be removed at the follow-up appointment.  5. Weight Bearing NWB (No weight bearing) left upper extremity.  6. Sling for one month.    7. Additional followup: None  8. Special instructions: None    Total time spent for discharge on date of discharge: 25 minutes    Physician(s) in addition to primary physician who should receive a copy:  Primary Care Physician Lacy Johnson  [facility], Fax: [phone]    YADIRA Padilla...................  11/6/2024   6:36 AM    "

## (undated) DEVICE — HOOD SURG T7PLUS PEEL AWAY FACE SHIELD STRL LF 0416-801-100

## (undated) DEVICE — PREP CHLORAPREP 26ML TINTED HI-LITE ORANGE 930815

## (undated) DEVICE — GLOVE BIOGEL PI MICRO INDICATOR UNDERGLOVE SZ 8.0 48980

## (undated) DEVICE — NDL 19GA 1.5"

## (undated) DEVICE — SUTURE NONABSORBABLE FIBERTAPE CERCLAGE LOADER AR-7268

## (undated) DEVICE — DRAPE STERI U 1015

## (undated) DEVICE — SU ETHICON STRATAFIX SPR PS 3-0 30X30CM SXMP2B412

## (undated) DEVICE — PACK OPEN SHOULDER SOP15OCFSC

## (undated) DEVICE — DRAPE CONVERTORS U-DRAPE 60X72" 8476

## (undated) DEVICE — BUR ARTHREX COOLCUT EXCALIBUR 4.0MMX13CM AR-8400EX

## (undated) DEVICE — SPONGE RAY-TEC 4X8" 7318

## (undated) DEVICE — SOL WATER IRRIG 1000ML BOTTLE 2F7114

## (undated) DEVICE — DRAPE SHEET REV FOLD 3/4 9349

## (undated) DEVICE — SOL NACL 0.9% IRRIG 1000ML BOTTLE 2F7124

## (undated) DEVICE — NDL SPINAL 18GA 3.5" 405184

## (undated) DEVICE — SUCTION IRR SYSTEM W/O TIP INTERPULSE HANDPIECE 0210-100-000

## (undated) DEVICE — SLING ARM LG 79-99157

## (undated) DEVICE — MANIFOLD NEPTUNE 4 PORT 700-20

## (undated) DEVICE — SU NDL MAYO LG 216701

## (undated) DEVICE — Device

## (undated) DEVICE — DRAPE ARTHROSCOPY SHOULDER BEACHCHAIR 29369

## (undated) DEVICE — SU DERMABOND PRINEO 22CM CLR222US

## (undated) DEVICE — BLADE KNIFE SURG 10 371110

## (undated) DEVICE — SU MONOCRYL 3-0 PS-2 27" Y427H

## (undated) DEVICE — BLADE SAW SAGITTAL STRK 18X90X1.27MM HD SYS 6 6118-127-090

## (undated) DEVICE — SU NDL MAYO 1/2 216703

## (undated) DEVICE — DRAPE IOBAN INCISE 23X17" 6650EZ

## (undated) DEVICE — DRSG ABDOMINAL 07 1/2X8" 7197D

## (undated) DEVICE — SUTURE STRATAFIX SPIRAL 3-0 SXMD2B412

## (undated) DEVICE — SUCTION TIP YANKAUER STR K87

## (undated) DEVICE — DRAPE STERI TOWEL LG 1010

## (undated) DEVICE — PILOT REMOVABLE FOR CANNULATED REAMER

## (undated) DEVICE — BONE CLEANING TIP INTERPULSE  0210-010-000

## (undated) DEVICE — KIT SHOULDER POSITIONING BEACH CHAIR ARM HOLDER AR-1644

## (undated) DEVICE — SYR 10ML FINGER CONTROL W/O NDL 309695

## (undated) DEVICE — SOL NACL 0.9% IRRIG 3000ML BAG 2B7477

## (undated) DEVICE — ESU GROUND PAD UNIVERSAL W/O CORD

## (undated) DEVICE — SOLUTION WOUND CLEANSING 3/4OZ 10% PVP EA-L3011FB-50

## (undated) DEVICE — NDL ARTHREX MULTIFIRE/FASTPASS SCORPION AR-13995N

## (undated) DEVICE — BONE CEMENT MIXEVAC HI VAC W/CARTRIDGE 0306-563-000

## (undated) DEVICE — SU STRATAFIX PDS PLUS 0 CT 45CM SXPP1A406

## (undated) DEVICE — GLOVE BIOGEL PI ORTHOPRO SZ 8.5 47685

## (undated) DEVICE — SU STRATAFIX MONOCRYL 2-0 SPIRAL SH 20CM SXMP1B409

## (undated) DEVICE — ARTHROSCOPIC CANNULA 5.5X70MM GRAY  9718

## (undated) DEVICE — SU ETHILON 3-0 FS-1 18" 669H

## (undated) DEVICE — LINEN TOWEL PACK X5 5464

## (undated) DEVICE — SU FIBERWIRE 2 38" T-8 NDL  AR-7206

## (undated) DEVICE — GLOVE SENSICARE PI MICRO PF 8.0 LATEX FREE MSG9680

## (undated) DEVICE — TUBING ARTHROSCOPY PUMP ARTHREX AR-6410

## (undated) DEVICE — PACK SHOULDER ARTHROSCOPY SOP15SAFSH

## (undated) DEVICE — PREP DURAPREP 26ML APL 8630

## (undated) DEVICE — NEEDLE HYPO MONOJECT STANDARD 22GA 1 1/2IN BLUE 1188822112

## (undated) DEVICE — SU FIBERWIRE 2 38"  AR-7200

## (undated) DEVICE — DRILL BIT TORNIER 3MM REVERSE STERILE DWD055

## (undated) DEVICE — DECANTER BAG 2002S

## (undated) DEVICE — SPONGE PACK VAGINAL 2X36"

## (undated) DEVICE — SYR 03ML LL W/O NDL 309657

## (undated) DEVICE — SUTURE NONABSORBABLE TIGERTAPE WITHOUT NEEDLE AR-7268T

## (undated) DEVICE — ESU PENCIL W/SMOKE EVAC NEPTUNE STRYKER 0703-046-000

## (undated) DEVICE — TUBING SET ARTHREX DUALWAVE OUTFLOW AR-6430

## (undated) DEVICE — ABLATOR ARTHREX APOLLO RF MP90 ASPIRATING 90DEG AR-9811

## (undated) DEVICE — SPONGE LAP 18X18" X8435

## (undated) DEVICE — GOWN XXL 9575

## (undated) DEVICE — PREP SKIN SCRUB TRAY 4461A

## (undated) DEVICE — IMPLANTABLE DEVICE
Type: IMPLANTABLE DEVICE | Site: SHOULDER | Status: NON-FUNCTIONAL
Brand: TORNIER FLEX SHOULDER SYSTEM

## (undated) DEVICE — TUBING SUCTION 6"X3/16" N56A

## (undated) DEVICE — PAD ABD 10X8IN DERMACEA ABS NWVN 4 SL EDG SFT LF STRL 7198D

## (undated) DEVICE — BLADE KNIFE SURG 15 371115

## (undated) DEVICE — CONNECTOR STOPCOCK 3 WAY MALE LL HI-FLO MX9311L

## (undated) DEVICE — BUR ARTHREX COOLCUT OVAL 5.5MMX13CM AR-8550FOE

## (undated) DEVICE — SOL NACL 0.9% INJ 250ML BAG 2B1322Q

## (undated) DEVICE — PACK SET-UP STD 9102

## (undated) DEVICE — ESU ELEC BLADE 6" COATED E1450-6

## (undated) DEVICE — DRAPE MAYO STAND 23X54 8337

## (undated) DEVICE — SU NDL MAYO TROCAR MED 217003

## (undated) RX ORDER — HALOPERIDOL 5 MG/ML
INJECTION INTRAMUSCULAR
Status: DISPENSED
Start: 2023-08-01

## (undated) RX ORDER — ACETAMINOPHEN 325 MG/1
TABLET ORAL
Status: DISPENSED
Start: 2024-10-15

## (undated) RX ORDER — APREPITANT 40 MG/1
CAPSULE ORAL
Status: DISPENSED
Start: 2023-08-01

## (undated) RX ORDER — ONDANSETRON 2 MG/ML
INJECTION INTRAMUSCULAR; INTRAVENOUS
Status: DISPENSED
Start: 2023-08-01

## (undated) RX ORDER — VANCOMYCIN HYDROCHLORIDE 1 G/20ML
INJECTION, POWDER, LYOPHILIZED, FOR SOLUTION INTRAVENOUS
Status: DISPENSED
Start: 2024-09-17

## (undated) RX ORDER — CEFAZOLIN SODIUM/WATER 3 G/30 ML
SYRINGE (ML) INTRAVENOUS
Status: DISPENSED
Start: 2024-10-15

## (undated) RX ORDER — ACETAMINOPHEN 325 MG/1
TABLET ORAL
Status: DISPENSED
Start: 2024-09-17

## (undated) RX ORDER — FENTANYL CITRATE 50 UG/ML
INJECTION, SOLUTION INTRAMUSCULAR; INTRAVENOUS
Status: DISPENSED
Start: 2024-09-17

## (undated) RX ORDER — CELECOXIB 200 MG/1
CAPSULE ORAL
Status: DISPENSED
Start: 2024-09-17

## (undated) RX ORDER — IPRATROPIUM BROMIDE AND ALBUTEROL SULFATE 2.5; .5 MG/3ML; MG/3ML
SOLUTION RESPIRATORY (INHALATION)
Status: DISPENSED
Start: 2023-08-01

## (undated) RX ORDER — HYDROMORPHONE HYDROCHLORIDE 1 MG/ML
INJECTION, SOLUTION INTRAMUSCULAR; INTRAVENOUS; SUBCUTANEOUS
Status: DISPENSED
Start: 2024-10-15

## (undated) RX ORDER — CELECOXIB 200 MG/1
CAPSULE ORAL
Status: DISPENSED
Start: 2024-10-15

## (undated) RX ORDER — ACETAMINOPHEN 325 MG/1
TABLET ORAL
Status: DISPENSED
Start: 2023-08-01

## (undated) RX ORDER — FENTANYL CITRATE 50 UG/ML
INJECTION, SOLUTION INTRAMUSCULAR; INTRAVENOUS
Status: DISPENSED
Start: 2024-10-15

## (undated) RX ORDER — ALBUTEROL SULFATE 90 UG/1
AEROSOL, METERED RESPIRATORY (INHALATION)
Status: DISPENSED
Start: 2023-08-01

## (undated) RX ORDER — TRANEXAMIC ACID 650 MG/1
TABLET ORAL
Status: DISPENSED
Start: 2024-09-17

## (undated) RX ORDER — FENTANYL CITRATE 0.05 MG/ML
INJECTION, SOLUTION INTRAMUSCULAR; INTRAVENOUS
Status: DISPENSED
Start: 2024-09-17

## (undated) RX ORDER — HYDROMORPHONE HYDROCHLORIDE 1 MG/ML
INJECTION, SOLUTION INTRAMUSCULAR; INTRAVENOUS; SUBCUTANEOUS
Status: DISPENSED
Start: 2023-08-01

## (undated) RX ORDER — FENTANYL CITRATE 50 UG/ML
INJECTION, SOLUTION INTRAMUSCULAR; INTRAVENOUS
Status: DISPENSED
Start: 2023-08-01

## (undated) RX ORDER — DEXAMETHASONE SODIUM PHOSPHATE 4 MG/ML
INJECTION, SOLUTION INTRA-ARTICULAR; INTRALESIONAL; INTRAMUSCULAR; INTRAVENOUS; SOFT TISSUE
Status: DISPENSED
Start: 2023-08-01

## (undated) RX ORDER — VANCOMYCIN HYDROCHLORIDE 1 G/20ML
INJECTION, POWDER, LYOPHILIZED, FOR SOLUTION INTRAVENOUS
Status: DISPENSED
Start: 2024-10-15

## (undated) RX ORDER — CELECOXIB 200 MG/1
CAPSULE ORAL
Status: DISPENSED
Start: 2023-08-01